# Patient Record
Sex: MALE | Race: BLACK OR AFRICAN AMERICAN | Employment: FULL TIME | URBAN - METROPOLITAN AREA
[De-identification: names, ages, dates, MRNs, and addresses within clinical notes are randomized per-mention and may not be internally consistent; named-entity substitution may affect disease eponyms.]

---

## 2017-05-09 ENCOUNTER — EMPLOYEE WELLNESS (OUTPATIENT)
Dept: OTHER | Age: 32
End: 2017-05-09

## 2017-05-09 LAB
CHOLESTEROL, TOTAL: 209 MG/DL (ref 0–199)
GLUCOSE BLD-MCNC: 69 MG/DL (ref 74–107)
HDLC SERPL-MCNC: 23 MG/DL
LDL CHOLESTEROL CALCULATED: ABNORMAL MG/DL (ref 0–99)
TRIGL SERPL-MCNC: 414 MG/DL (ref 0–149)

## 2017-05-10 LAB — LDL CHOLESTEROL DIRECT: 102 MG/DL (ref 0–129)

## 2018-03-20 VITALS — BODY MASS INDEX: 30.96 KG/M2 | WEIGHT: 222 LBS

## 2018-04-09 ENCOUNTER — EMPLOYEE WELLNESS (OUTPATIENT)
Dept: OTHER | Age: 33
End: 2018-04-09

## 2018-04-09 LAB
CHOLESTEROL, TOTAL: 252 MG/DL (ref 0–199)
GLUCOSE BLD-MCNC: 88 MG/DL (ref 74–107)
HDLC SERPL-MCNC: 16 MG/DL
LDL CHOLESTEROL CALCULATED: ABNORMAL MG/DL (ref 0–99)
TRIGL SERPL-MCNC: 1882 MG/DL (ref 0–149)

## 2018-04-16 VITALS — WEIGHT: 229 LBS | BODY MASS INDEX: 32.86 KG/M2

## 2018-06-20 ENCOUNTER — HOSPITAL ENCOUNTER (OUTPATIENT)
Dept: GENERAL RADIOLOGY | Age: 33
Discharge: HOME | End: 2018-06-22
Payer: COMMERCIAL

## 2018-06-20 ENCOUNTER — HOSPITAL ENCOUNTER (OUTPATIENT)
Age: 33
Discharge: HOME | End: 2018-06-22
Payer: COMMERCIAL

## 2018-06-20 DIAGNOSIS — M25.531 RIGHT WRIST PAIN: ICD-10-CM

## 2018-06-20 PROCEDURE — 73110 X-RAY EXAM OF WRIST: CPT

## 2018-07-24 ENCOUNTER — OFFICE VISIT (OUTPATIENT)
Dept: ORTHOPEDIC SURGERY | Age: 33
End: 2018-07-24
Payer: COMMERCIAL

## 2018-07-24 VITALS — HEIGHT: 71 IN | TEMPERATURE: 98 F | BODY MASS INDEX: 30.1 KG/M2 | WEIGHT: 215 LBS

## 2018-07-24 DIAGNOSIS — M77.8 WRIST TENDONITIS: Primary | ICD-10-CM

## 2018-07-24 DIAGNOSIS — M79.89 SOFT TISSUE MASS: ICD-10-CM

## 2018-07-24 PROCEDURE — 99204 OFFICE O/P NEW MOD 45 MIN: CPT | Performed by: ORTHOPAEDIC SURGERY

## 2018-07-24 RX ORDER — MELOXICAM 15 MG/1
15 TABLET ORAL DAILY
Qty: 30 TABLET | Refills: 3 | Status: SHIPPED
Start: 2018-07-24 | End: 2021-09-24 | Stop reason: ALTCHOICE

## 2018-07-24 RX ORDER — METHYLPREDNISOLONE 4 MG/1
4 TABLET ORAL SEE ADMIN INSTRUCTIONS
Qty: 1 KIT | Refills: 0 | Status: SHIPPED | OUTPATIENT
Start: 2018-07-24 | End: 2018-07-30

## 2018-07-24 NOTE — PROGRESS NOTES
legs/feet, (-) SOB, (-) cramping in legs/feet with walking. Respiratory: (-) SOB, (-) Coughing, (-) night sweats. GI: (-) nausea, (-) vomiting, (-) diarrhea, (-) blood in stool, (-) gastric ulcer. Psychiatric: (-) Depression, (-) Anxiety, (-) bipolar disease, (-) Alzheimer's Disease  Allergic/Immunologic: (-) allergies latex, (-) allergies metal, (-) skin sensitivity. Hematlogic: (-) anemia, (-) blood transfusion, (-) DVT/PE, (-) Clotting disorders      Subjective:    Constitution:  Temp 98 °F (36.7 °C)   Ht 5' 11\" (1.803 m)   Wt 215 lb (97.5 kg)   BMI 29.99 kg/m²     Psycihatric:  The patient is alert and oriented x 3, appears to be stated age and in no distress. Respiratory:  Respiratory effort is not labored. Patient is not gasping. Palpation of the chest reveals no tactile fremitus. Skin:  Upon inspection: the skin appears warm, dry and intact. There is not a previous scar over the affected area. There is not any cellulitis, lymphedema or cutaneous lesions noted in the lower extremities. Upon palpation there is no induration noted. Neurologic:  Motor exam of the upper extremities show: The reflexes in biceps/triceps/brachioradialis are equal and symmetric. Sensory exam C5-T1 are normal bilaterally. Cardiovascular: The vascular exam is normal and is well perfused to distal extremities. There are 2+ radial pulses bilaterally, and motor and sensation is intact to median, ulnar, and radial, musclocutaneus, and axillary nerve distribution and grossly symmetric bilaterally. There is cap refill noted less than two seconds in all digits. There is not edema of the bilateral upper extremities. There is not varicosities noted in the distal extremities. Lymph:  Upon palpation,  there is no lymphadenopathy noted in bilateral upper extremities. Musculoskeletal:  Gait: normal; examination of the nails and digits reveal no cyanosis or clubbing.     Cervical Exam:  On physical exam, Coy Penn is well-developed, well-nourished, oriented to person, place and time. his gait is normal.  On evaluation of his cervical spine, he has full range of motion of the cervical spine without pain. There is no cervical tenderness to palpation. Shoulder Exam:  On evaluation of his bilaterally upper extremities, his bilateral shoulder has no deformity. There is not evidence of scapular dyskinesis. There is not muscle atrophy in shoulder girdle. The range of motion for the Right Shoulder is 160/50/t10 and for the Left shoulder is 160/50/t10. Right shoulder Motor strength is 5/5 in the supraspinatus, 5/5 internal rotation and 5/5 in external rotation, and Left shoulder motor strength 5/5 in supraspinatus, 5/5 in internal rotation, 5/5 in external rotation. Elbow exam:  Evaluation of the elbow, reveals no signs of swelling or deformity. ROM is 0-150. There is not instability with varus/valgus stresses. Motor strength is 5/5 with flexion/extension. Wrist exam:  Inspection of the bilateral upper extremities, there is no evidence of deformity of the wrist.  ROM Wrist ROM R wrist DF 90, VF 90, L wrist DF 90, VF 90, R pronation 90/ supination 90, L pronation 90/supination 90. Motor strength is 5/5 with Dorsiflexion/Volarflexion/Supination/Pronation. Motor and sensation is intact and symmetric throughout the bilateral upper extremities in the median, ulnar and radial , musclcutaneous, and axillary nerve distributions. TTP over ulnar sided wrist    Hand exam:  The skin overlying the hand is  intact. There is not evidence of scar, lesion, laceration, or abrasion. The motion in the small joints of the hand are intact with no stiffness or deformity. The ROM in the MCP flexion 90/ extension 0 , PIP flexion 90/ extension 0, DIP flexion 70/ extension 0. There is not rotational deformity. There is no masses or adenopathy in bilateral upper extremities.   Radial pulses are 2+ and symmetric bilaterally. Capillary refill is intact and < 2 seconds. Motor strength is 5/5 with flexion and extension of the small finger joints. Right:  Phallens sign(-), Tinnells sign (-), Median nerve compression test (-),  Finklesteins (-), CMC Grind test (-), [employer](-). Left:    Phallens sign(-), Tinnells sign (-), Median nerve compression test (-),  Finklesteins (-), [employer] Grind test (-), [employer](-). Xrays: There is a smooth contour deformity involving the distal ulna along   the radial margin. There is suggestion of an adjacent ill-defined soft   tissue mass. No periosteal reaction or bone destruction noted. The   joint intercarpal spaces are symmetric. No erosive changes identified. No calcification present. No fracture or malalignment noted. Radiographic findings reviewed with patient    Impression:   Encounter Diagnoses   Name Primary?  Wrist tendonitis Yes    Soft tissue mass        Plan: Natural history and expected course discussed. Questions answered. Educational materials distributed. Rest, ice, compression, and elevation (RICE) therapy. Reduction in offending activity discussed.    HEP  MRI right wrist  FU after MRI for results

## 2018-07-24 NOTE — PATIENT INSTRUCTIONS
Patient Education        Wrist Tendinitis: Exercises  Your Care Instructions  Here are some examples of typical rehabilitation exercises for your condition. Start each exercise slowly. Ease off the exercise if you start to have pain. Your doctor or your physical or occupational therapist will tell you when you can start these exercises and which ones will work best for you. How to do the exercises  Wrist flexion and extension    1. Place your forearm on a table, with your hand and affected wrist extended beyond the table, palm down. 2. Bend your wrist to move your hand upward and allow your hand to close into a fist, then lower your hand and allow your fingers to relax. Hold each position for about 6 seconds. 3. Repeat 8 to 12 times. Hand flips    1. While seated, place your forearm and affected wrist on your thigh, palm down. 2. Flip your hand over so the back of your hand rests on your thigh and your palm is up. Alternate between palm up and palm down while keeping your forearm on your thigh. 3. Repeat 8 to 12 times. Wrist radial and ulnar deviation    1. Hold your affected hand out in front of you, palm down. 2. Slowly bend your wrist as far as you can from side to side. Hold each position for about 6 seconds. 3. Repeat 8 to 12 times. Wrist extensor stretch    1. Extend the arm with the affected wrist in front of you and point your fingers toward the floor. 2. With your other hand, gently bend your wrist farther until you feel a mild to moderate stretch in your forearm. 3. Hold the stretch for at least 15 to 30 seconds. 4. Repeat 2 to 4 times. 5. When you can do this stretch with ease and no pain, repeat steps 1 through 4. But this time extend your affected arm in front of you and make a fist with your palm facing down. Then bend your wrist, pointing your fist toward the floor. Wrist flexor stretch    1.  Extend the arm with the affected wrist in front of you with your palm facing away from your

## 2018-07-31 ENCOUNTER — HOSPITAL ENCOUNTER (OUTPATIENT)
Dept: MRI IMAGING | Age: 33
Discharge: HOME | End: 2018-08-02
Payer: COMMERCIAL

## 2018-07-31 DIAGNOSIS — M79.89 SOFT TISSUE MASS: ICD-10-CM

## 2018-07-31 DIAGNOSIS — M77.8 WRIST TENDONITIS: ICD-10-CM

## 2018-07-31 PROCEDURE — A9577 INJ MULTIHANCE: HCPCS | Performed by: RADIOLOGY

## 2018-07-31 PROCEDURE — 73223 MRI JOINT UPR EXTR W/O&W/DYE: CPT

## 2018-07-31 PROCEDURE — 6360000004 HC RX CONTRAST MEDICATION: Performed by: RADIOLOGY

## 2018-07-31 RX ADMIN — GADOBENATE DIMEGLUMINE 20 ML: 529 INJECTION, SOLUTION INTRAVENOUS at 07:40

## 2018-08-02 ENCOUNTER — OFFICE VISIT (OUTPATIENT)
Dept: ORTHOPEDIC SURGERY | Age: 33
End: 2018-08-02
Payer: COMMERCIAL

## 2018-08-02 DIAGNOSIS — S63.501A SPRAIN OF RIGHT WRIST, INITIAL ENCOUNTER: Primary | ICD-10-CM

## 2018-08-02 PROCEDURE — 99214 OFFICE O/P EST MOD 30 MIN: CPT | Performed by: ORTHOPAEDIC SURGERY

## 2018-08-02 NOTE — PROGRESS NOTES
Chief Complaint   Patient presents with    Wrist Pain     follow up right wrist MRI       Stephani Centeno is a 34y.o. year old male that follows up today for his right wrist pain. He recently underwent an MRI and is here to discuss the results. History reviewed. No pertinent past medical history. Past Surgical History:   Procedure Laterality Date    NECK SURGERY         Current Outpatient Prescriptions:     meloxicam (MOBIC) 15 MG tablet, Take 1 tablet by mouth daily, Disp: 30 tablet, Rfl: 3  No Known Allergies  Social History     Social History    Marital status: Single     Spouse name: N/A    Number of children: N/A    Years of education: N/A     Occupational History    Not on file. Social History Main Topics    Smoking status: Never Smoker    Smokeless tobacco: Never Used    Alcohol use Yes      Comment: occ    Drug use: No    Sexual activity: Not on file     Other Topics Concern    Not on file     Social History Narrative    No narrative on file     Family History   Problem Relation Age of Onset    Other Father         kidney issues       REVIEW OF SYSTEMS:     General/Constitution:  (-)weight loss, (-)fever, (-)chills, (-)weakness. Skin: (-) rash,(-) psoriasis,(-) eczema, (-)skin cancer. Musculoskeletal: (-) fractures,  (-) dislocations,(-) collagen vascular disease, (-) fibromyalgia, (-) multiple sclerosis, (-) muscular dystrophy, (-) RSD,(-) joint pain (-)swelling, (-) joint pain,swelling. Neurologic: (-) epilepsy, (-)seizures,(-) brain tumor,(-) TIA, (-)stroke, (-)headaches, (-)Parkinson disease,(-) memory loss, (-) LOC. Cardiovascular: (-) Chest pain, (-) swelling in legs/feet, (-) SOB, (-) cramping in legs/feet with walking. Respiratory: (-) SOB, (-) Coughing, (-) night sweats. GI: (-) nausea, (-) vomiting, (-) diarrhea, (-) blood in stool, (-) gastric ulcer.   Psychiatric: (-) Depression, (-) Anxiety, (-) bipolar disease, (-) Alzheimer's Disease  Allergic/Immunologic: (-) [place] (-), [employer] Grind test (-), [employer](-). Xrays: There is a smooth contour deformity involving the distal ulna along   the radial margin. There is suggestion of an adjacent ill-defined soft   tissue mass. No periosteal reaction or bone destruction noted. The   joint intercarpal spaces are symmetric. No erosive changes identified. No calcification present. No fracture or malalignment noted. MRI:  1. No soft tissue mass or abnormal collection is identified. 2. Tendons are intact without evidence of tenosynovitis. Radiographic findings reviewed with patient    Impression:   Encounter Diagnosis   Name Primary?  Sprain of right wrist, initial encounter Yes       Plan: Natural history and expected course discussed. Questions answered. Educational materials distributed. Rest, ice, compression, and elevation (RICE) therapy. Reduction in offending activity discussed. His MRI is normal.  He will continue with activities as tolerated and he will stop all meds. If his pain worsens, he will give us a call and I can perform an injection. I will see him back PRN.

## 2019-03-23 ENCOUNTER — HOSPITAL ENCOUNTER (EMERGENCY)
Age: 34
Discharge: HOME | End: 2019-03-23
Attending: EMERGENCY MEDICINE
Payer: COMMERCIAL

## 2019-03-23 VITALS
BODY MASS INDEX: 32.92 KG/M2 | RESPIRATION RATE: 20 BRPM | WEIGHT: 236 LBS | OXYGEN SATURATION: 96 % | HEART RATE: 96 BPM | TEMPERATURE: 98 F | DIASTOLIC BLOOD PRESSURE: 90 MMHG | SYSTOLIC BLOOD PRESSURE: 142 MMHG

## 2019-03-23 DIAGNOSIS — J00 ACUTE NASOPHARYNGITIS: Primary | ICD-10-CM

## 2019-03-23 DIAGNOSIS — A64 SEXUALLY TRANSMITTED INFECTION: ICD-10-CM

## 2019-03-23 DIAGNOSIS — H65.192 OTHER ACUTE NONSUPPURATIVE OTITIS MEDIA OF LEFT EAR, RECURRENCE NOT SPECIFIED: ICD-10-CM

## 2019-03-23 PROCEDURE — 6370000000 HC RX 637 (ALT 250 FOR IP): Performed by: EMERGENCY MEDICINE

## 2019-03-23 PROCEDURE — 6360000002 HC RX W HCPCS: Performed by: EMERGENCY MEDICINE

## 2019-03-23 PROCEDURE — 99282 EMERGENCY DEPT VISIT SF MDM: CPT

## 2019-03-23 PROCEDURE — 96372 THER/PROPH/DIAG INJ SC/IM: CPT

## 2019-03-23 RX ORDER — AZITHROMYCIN 250 MG/1
1000 TABLET, FILM COATED ORAL ONCE
Status: COMPLETED | OUTPATIENT
Start: 2019-03-23 | End: 2019-03-23

## 2019-03-23 RX ORDER — AZITHROMYCIN 250 MG/1
TABLET, FILM COATED ORAL
Qty: 1 PACKET | Refills: 0 | Status: SHIPPED | OUTPATIENT
Start: 2019-03-23 | End: 2019-03-27

## 2019-03-23 RX ORDER — METRONIDAZOLE 250 MG/1
2000 TABLET ORAL ONCE
Status: COMPLETED | OUTPATIENT
Start: 2019-03-23 | End: 2019-03-23

## 2019-03-23 RX ORDER — CEFTRIAXONE SODIUM 250 MG/1
250 INJECTION, POWDER, FOR SOLUTION INTRAMUSCULAR; INTRAVENOUS ONCE
Status: COMPLETED | OUTPATIENT
Start: 2019-03-23 | End: 2019-03-23

## 2019-03-23 RX ORDER — BENZONATATE 100 MG/1
100-200 CAPSULE ORAL 3 TIMES DAILY PRN
Qty: 60 CAPSULE | Refills: 0 | Status: SHIPPED | OUTPATIENT
Start: 2019-03-23 | End: 2019-03-30

## 2019-03-23 RX ADMIN — CEFTRIAXONE SODIUM 250 MG: 250 INJECTION, POWDER, FOR SOLUTION INTRAMUSCULAR; INTRAVENOUS at 12:20

## 2019-03-23 RX ADMIN — METRONIDAZOLE 2000 MG: 250 TABLET ORAL at 12:22

## 2019-03-23 RX ADMIN — AZITHROMYCIN 1000 MG: 250 TABLET, FILM COATED ORAL at 12:20

## 2019-03-23 ASSESSMENT — PAIN DESCRIPTION - LOCATION: LOCATION: HEAD

## 2019-03-23 ASSESSMENT — PAIN SCALES - GENERAL
PAINLEVEL_OUTOF10: 1
PAINLEVEL_OUTOF10: 1

## 2019-03-23 ASSESSMENT — PAIN DESCRIPTION - ONSET: ONSET: GRADUAL

## 2019-03-23 ASSESSMENT — PAIN - FUNCTIONAL ASSESSMENT: PAIN_FUNCTIONAL_ASSESSMENT: 0-10

## 2019-03-23 ASSESSMENT — PAIN DESCRIPTION - FREQUENCY: FREQUENCY: CONTINUOUS

## 2019-03-23 ASSESSMENT — PAIN DESCRIPTION - PROGRESSION: CLINICAL_PROGRESSION: NOT CHANGED

## 2019-03-23 ASSESSMENT — PAIN DESCRIPTION - DESCRIPTORS: DESCRIPTORS: ACHING

## 2019-07-15 ENCOUNTER — HOSPITAL ENCOUNTER (EMERGENCY)
Age: 34
Discharge: HOME | End: 2019-07-15
Payer: COMMERCIAL

## 2019-07-15 VITALS
OXYGEN SATURATION: 98 % | BODY MASS INDEX: 30.68 KG/M2 | TEMPERATURE: 98 F | DIASTOLIC BLOOD PRESSURE: 85 MMHG | HEART RATE: 104 BPM | WEIGHT: 220 LBS | RESPIRATION RATE: 16 BRPM | SYSTOLIC BLOOD PRESSURE: 132 MMHG

## 2019-07-15 DIAGNOSIS — Z20.2 EXPOSURE TO STD: Primary | ICD-10-CM

## 2019-07-15 PROCEDURE — 6370000000 HC RX 637 (ALT 250 FOR IP): Performed by: NURSE PRACTITIONER

## 2019-07-15 PROCEDURE — 6360000002 HC RX W HCPCS: Performed by: NURSE PRACTITIONER

## 2019-07-15 PROCEDURE — 2500000003 HC RX 250 WO HCPCS: Performed by: NURSE PRACTITIONER

## 2019-07-15 PROCEDURE — 99212 OFFICE O/P EST SF 10 MIN: CPT

## 2019-07-15 PROCEDURE — 96372 THER/PROPH/DIAG INJ SC/IM: CPT

## 2019-07-15 RX ORDER — AZITHROMYCIN 250 MG/1
1000 TABLET, FILM COATED ORAL ONCE
Status: COMPLETED | OUTPATIENT
Start: 2019-07-15 | End: 2019-07-15

## 2019-07-15 RX ADMIN — LIDOCAINE HYDROCHLORIDE 250 MG: 10 INJECTION, SOLUTION INFILTRATION; PERINEURAL at 18:27

## 2019-07-15 RX ADMIN — AZITHROMYCIN 1000 MG: 250 TABLET, FILM COATED ORAL at 18:27

## 2019-07-15 NOTE — ED PROVIDER NOTES
specific details for the plan of care and counseling regarding the diagnosis and prognosis. Questions are answered at this time and they are agreeable with the plan.  ------------------------------------Impression & Disposition Section------------------------------------  Impression(s):  1. Exposure to STD      Disposition:  Disposition: Discharge to home  Patient condition is good    New Prescriptions    No medications on file     * NOTE: This report was transcribed using voice recognition software. Every effort was made to ensure accuracy; however, inadvertent computerized transcription errors may be present.      Collette Lights, APRN - LIAN  07/15/19 1115

## 2020-01-26 ENCOUNTER — APPOINTMENT (OUTPATIENT)
Dept: GENERAL RADIOLOGY | Age: 35
End: 2020-01-26
Payer: COMMERCIAL

## 2020-01-26 ENCOUNTER — HOSPITAL ENCOUNTER (EMERGENCY)
Age: 35
Discharge: HOME | End: 2020-01-26
Payer: COMMERCIAL

## 2020-01-26 VITALS
RESPIRATION RATE: 16 BRPM | OXYGEN SATURATION: 97 % | DIASTOLIC BLOOD PRESSURE: 90 MMHG | TEMPERATURE: 98.1 F | SYSTOLIC BLOOD PRESSURE: 145 MMHG | WEIGHT: 220 LBS | HEART RATE: 85 BPM | BODY MASS INDEX: 30.68 KG/M2

## 2020-01-26 PROCEDURE — 96372 THER/PROPH/DIAG INJ SC/IM: CPT

## 2020-01-26 PROCEDURE — 6360000002 HC RX W HCPCS: Performed by: PHYSICIAN ASSISTANT

## 2020-01-26 PROCEDURE — 2500000003 HC RX 250 WO HCPCS: Performed by: PHYSICIAN ASSISTANT

## 2020-01-26 PROCEDURE — 73110 X-RAY EXAM OF WRIST: CPT

## 2020-01-26 PROCEDURE — 99212 OFFICE O/P EST SF 10 MIN: CPT

## 2020-01-26 PROCEDURE — 6370000000 HC RX 637 (ALT 250 FOR IP): Performed by: PHYSICIAN ASSISTANT

## 2020-01-26 RX ORDER — AZITHROMYCIN 250 MG/1
1000 TABLET, FILM COATED ORAL ONCE
Status: COMPLETED | OUTPATIENT
Start: 2020-01-26 | End: 2020-01-26

## 2020-01-26 RX ADMIN — AZITHROMYCIN MONOHYDRATE 1000 MG: 250 TABLET ORAL at 19:18

## 2020-01-26 RX ADMIN — LIDOCAINE HYDROCHLORIDE 250 MG: 10 INJECTION, SOLUTION INFILTRATION; PERINEURAL at 19:18

## 2020-01-26 ASSESSMENT — PAIN DESCRIPTION - PAIN TYPE: TYPE: ACUTE PAIN

## 2020-01-26 ASSESSMENT — PAIN SCALES - GENERAL: PAINLEVEL_OUTOF10: 6

## 2020-01-26 ASSESSMENT — PAIN DESCRIPTION - ORIENTATION: ORIENTATION: RIGHT

## 2020-01-26 ASSESSMENT — PAIN DESCRIPTION - LOCATION: LOCATION: WRIST

## 2020-01-26 ASSESSMENT — PAIN DESCRIPTION - DESCRIPTORS: DESCRIPTORS: ACHING

## 2020-05-27 ENCOUNTER — HOSPITAL ENCOUNTER (EMERGENCY)
Age: 35
Discharge: HOME | End: 2020-05-27
Attending: EMERGENCY MEDICINE
Payer: COMMERCIAL

## 2020-05-27 VITALS
SYSTOLIC BLOOD PRESSURE: 139 MMHG | RESPIRATION RATE: 16 BRPM | TEMPERATURE: 97.1 F | DIASTOLIC BLOOD PRESSURE: 92 MMHG | HEIGHT: 72 IN | WEIGHT: 200 LBS | HEART RATE: 86 BPM | OXYGEN SATURATION: 98 % | BODY MASS INDEX: 27.09 KG/M2

## 2020-05-27 PROCEDURE — 6360000002 HC RX W HCPCS: Performed by: EMERGENCY MEDICINE

## 2020-05-27 PROCEDURE — G0381 LEV 2 HOSP TYPE B ED VISIT: HCPCS

## 2020-05-27 PROCEDURE — 96372 THER/PROPH/DIAG INJ SC/IM: CPT

## 2020-05-27 RX ORDER — METRONIDAZOLE 500 MG/1
2000 TABLET ORAL ONCE
Qty: 4 TABLET | Refills: 0 | Status: SHIPPED | OUTPATIENT
Start: 2020-05-27 | End: 2020-05-27

## 2020-05-27 RX ORDER — CEFTRIAXONE SODIUM 250 MG/1
250 INJECTION, POWDER, FOR SOLUTION INTRAMUSCULAR; INTRAVENOUS ONCE
Status: COMPLETED | OUTPATIENT
Start: 2020-05-27 | End: 2020-05-27

## 2020-05-27 RX ORDER — AZITHROMYCIN 250 MG/1
TABLET, FILM COATED ORAL
Qty: 4 TABLET | Refills: 0 | Status: SHIPPED | OUTPATIENT
Start: 2020-05-27 | End: 2020-06-06

## 2020-05-27 RX ORDER — DIAPER,BRIEF,INFANT-TODD,DISP
EACH MISCELLANEOUS
Qty: 1 TUBE | Refills: 1 | Status: SHIPPED | OUTPATIENT
Start: 2020-05-27 | End: 2020-06-03

## 2020-05-27 RX ADMIN — CEFTRIAXONE SODIUM 250 MG: 250 INJECTION, POWDER, FOR SOLUTION INTRAMUSCULAR; INTRAVENOUS at 18:39

## 2020-08-17 ENCOUNTER — HOSPITAL ENCOUNTER (EMERGENCY)
Age: 35
Discharge: HOME | End: 2020-08-17
Attending: EMERGENCY MEDICINE
Payer: COMMERCIAL

## 2020-08-17 VITALS
HEIGHT: 72 IN | WEIGHT: 208 LBS | OXYGEN SATURATION: 98 % | SYSTOLIC BLOOD PRESSURE: 140 MMHG | DIASTOLIC BLOOD PRESSURE: 98 MMHG | HEART RATE: 81 BPM | BODY MASS INDEX: 28.17 KG/M2 | TEMPERATURE: 96.1 F | RESPIRATION RATE: 16 BRPM

## 2020-08-17 PROCEDURE — G0381 LEV 2 HOSP TYPE B ED VISIT: HCPCS

## 2020-08-17 PROCEDURE — 6360000002 HC RX W HCPCS: Performed by: EMERGENCY MEDICINE

## 2020-08-17 PROCEDURE — 96372 THER/PROPH/DIAG INJ SC/IM: CPT

## 2020-08-17 RX ORDER — CEFTRIAXONE SODIUM 250 MG/1
250 INJECTION, POWDER, FOR SOLUTION INTRAMUSCULAR; INTRAVENOUS ONCE
Status: COMPLETED | OUTPATIENT
Start: 2020-08-17 | End: 2020-08-17

## 2020-08-17 RX ORDER — CEFTRIAXONE SODIUM 250 MG/1
INJECTION, POWDER, FOR SOLUTION INTRAMUSCULAR; INTRAVENOUS
Status: DISPENSED
Start: 2020-08-17 | End: 2020-08-18

## 2020-08-17 RX ORDER — METRONIDAZOLE 500 MG/1
2000 TABLET ORAL ONCE
Qty: 4 TABLET | Refills: 0 | Status: SHIPPED | OUTPATIENT
Start: 2020-08-17 | End: 2020-08-17

## 2020-08-17 RX ORDER — AZITHROMYCIN 250 MG/1
TABLET, FILM COATED ORAL
Qty: 4 TABLET | Refills: 0 | Status: SHIPPED | OUTPATIENT
Start: 2020-08-17 | End: 2020-08-27

## 2020-08-17 RX ORDER — DIAPER,BRIEF,INFANT-TODD,DISP
EACH MISCELLANEOUS
Qty: 1 TUBE | Refills: 1 | Status: SHIPPED | OUTPATIENT
Start: 2020-08-17 | End: 2020-08-24

## 2020-08-17 RX ADMIN — CEFTRIAXONE SODIUM 250 MG: 250 INJECTION, POWDER, FOR SOLUTION INTRAMUSCULAR; INTRAVENOUS at 19:35

## 2020-08-17 NOTE — ED PROVIDER NOTES
EOMI  Mouth: Oropharynx clear, handling secretions, no trismus  Neck: Supple, full ROM, no meningeal signs  Pulmonary: Lungs clear to auscultation bilaterally, no wheezes, rales, or rhonchi. Not in respiratory distress  Cardiovascular:  Regular rate and rhythm, no murmurs, gallops, or rubs. 2+ distal pulses  Abdomen: Soft, non tender, non distended,   Extremities: Moves all extremities x 4. Warm and well perfused  Skin: scattered erythematous papulovesicular lesions on face  Neurologic: GCS 15,  Psych: Normal Affect      ------------------------------ ED COURSE/MEDICAL DECISION MAKING----------------------  Medications   cefTRIAXone (ROCEPHIN) injection 250 mg (250 mg Intramuscular Given 8/17/20 1935)         Medical Decision Making:      Patient's Medications   New Prescriptions    AZITHROMYCIN (ZITHROMAX) 250 MG TABLET    Take all 4 tablets orally at one time    HYDROCORTISONE 1 % CREAM    APPLY TO AFFECTED AREA BID FOR 10 DAYS    METRONIDAZOLE (FLAGYL) 500 MG TABLET    Take 4 tablets by mouth once for 1 dose    MUPIROCIN (BACTROBAN) 2 % OINTMENT    Apply topically 3 times daily. Previous Medications    MELOXICAM (MOBIC) 15 MG TABLET    Take 1 tablet by mouth daily   Modified Medications    No medications on file   Discontinued Medications    No medications on file         Counseling: The emergency provider has spoken with the patient and discussed todays results, in addition to providing specific details for the plan of care and counseling regarding the diagnosis and prognosis. Questions are answered at this time and they are agreeable with the plan.      --------------------------------- IMPRESSION AND DISPOSITION ---------------------------------    IMPRESSION  1. Irritant dermatitis    2.  Concern about STD in male without diagnosis        DISPOSITION  Disposition: Discharge to home  Patient condition is good                 César Yan MD  08/17/20 1933       Brad Ortiz MD  08/17/20 [id number]

## 2020-12-04 ENCOUNTER — HOSPITAL ENCOUNTER (EMERGENCY)
Age: 35
Discharge: HOME | End: 2020-12-04
Payer: COMMERCIAL

## 2020-12-04 VITALS
RESPIRATION RATE: 14 BRPM | SYSTOLIC BLOOD PRESSURE: 114 MMHG | HEART RATE: 90 BPM | TEMPERATURE: 97.4 F | OXYGEN SATURATION: 95 % | WEIGHT: 220 LBS | HEIGHT: 71 IN | DIASTOLIC BLOOD PRESSURE: 76 MMHG | BODY MASS INDEX: 30.8 KG/M2

## 2020-12-04 PROCEDURE — 99212 OFFICE O/P EST SF 10 MIN: CPT

## 2020-12-04 PROCEDURE — 6370000000 HC RX 637 (ALT 250 FOR IP): Performed by: NURSE PRACTITIONER

## 2020-12-04 RX ORDER — TETRACAINE HYDROCHLORIDE 5 MG/ML
2 SOLUTION OPHTHALMIC ONCE
Status: COMPLETED | OUTPATIENT
Start: 2020-12-04 | End: 2020-12-04

## 2020-12-04 RX ORDER — POLYMYXIN B SULFATE AND TRIMETHOPRIM 1; 10000 MG/ML; [USP'U]/ML
1 SOLUTION OPHTHALMIC EVERY 4 HOURS
Qty: 1 BOTTLE | Refills: 0 | Status: SHIPPED | OUTPATIENT
Start: 2020-12-04 | End: 2020-12-11

## 2020-12-04 RX ORDER — PURIFIED WATER 986 MG/ML
1 SOLUTION OPHTHALMIC ONCE
Status: COMPLETED | OUTPATIENT
Start: 2020-12-04 | End: 2020-12-04

## 2020-12-04 RX ADMIN — PURIFIED WATER 1 DROP: 986 SOLUTION OPHTHALMIC at 09:03

## 2020-12-04 RX ADMIN — TETRACAINE HYDROCHLORIDE 2 DROP: 5 SOLUTION OPHTHALMIC at 09:03

## 2020-12-04 RX ADMIN — FLUORESCEIN SODIUM 1 EACH: 0.6 STRIP OPHTHALMIC at 09:03

## 2020-12-04 NOTE — ED PROVIDER NOTES
Department of Emergency Medicine  ED Provider Note  Admit Date: 12/4/2020  Room: 02/02            HPI:  12/4/20, Time: 8:59 AM SB Sanchez is a 27 y.o. old male presenting to the emergency department with complaints of right eye irritation. He said this started yesterday. He said he does not wear contacts or glasses. He states it feels like there is something in it. He said it is watering a lot and is red. MecReview of Systems:   Pertinent positives and negatives are stated within HPI, all other systems reviewed and are negative.          --------------------------------------------- PAST HISTORY ---------------------------------------------  Past Medical History:  has no past medical history on file. Past Surgical History:  has a past surgical history that includes Neck surgery. Social History:  reports that he has never smoked. He has never used smokeless tobacco. He reports current alcohol use. He reports that he does not use drugs. Family History: family history includes Other in his father. The patients home medications have been reviewed. Allergies: Patient has no known allergies. ---------------------------------------------------PHYSICAL EXAM--------------------------------------    Constitutional/General: Alert and oriented x3, well appearing, non toxic in NAD  Head: Normocephalic and atraumatic. No periorbital erythema or warmth or swelling  Eyes: PERRL, 3 mm, conjunctiva right is redddened,  no evidence of hyphema, no evidence of entrapment. I No pain with EOM, and EOMI. Direct and consensual light reflex normal.    Mouth: Oropharynx clear, handling secretions, no trismus  Neck: Supple, full ROM, non tender to palpation in the midline,   Pulmonary: Lungs clear to auscultation bilaterally. Not in respiratory distress  Cardiovascular:  Regular rate. Regular rhythm  Abdomen: Soft. Non tender. Non distended. Musculoskeletal: Moves all extremities x 4. Warm and well perfused  Skin: warm and dry. No rashes. Neurologic: GCS 15  Psych: Normal Affect    -------------------------------------------------- RESULTS -------------------------------------------------  I have personally reviewed all laboratory and imaging results for this patient. Results are listed below. LABS:  No results found for this visit on 12/04/20. RADIOLOGY:  Interpreted by Radiologist.  No orders to display           ------------------------- NURSING NOTES AND VITALS REVIEWED ---------------------------   The nursing notes within the ED encounter and vital signs as below have been reviewed by myself. /76   Pulse 90   Temp 97.4 °F (36.3 °C) (Infrared)   Resp 14   Ht 5' 11\" (1.803 m)   Wt 220 lb (99.8 kg)   SpO2 95%   BMI 30.68 kg/m²   Oxygen Saturation Interpretation: Normal    The patients available past medical records and past encounters were reviewed. ------------------------------ ED COURSE/MEDICAL DECISION MAKING----------------------  Medications   fluorescein ophthalmic strip 1 each (1 each Left Eye Given 12/4/20 0903)   tetracaine (TETRAVISC) 0.5 % ophthalmic solution 2 drop (2 drops Ophthalmic Given 12/4/20 0903)   eye stream ophthalmic solution 1 drop (1 drop Right Eye Given 12/4/20 0903)                 Medical Decision Making: I anesthetized his eye with tetracaine, I stained his eye with fluorescein I do not see any corneal abrasions is a small amount of whitish drainage at the inner canthus area of the eye I did put him on Polytrim eyedrops and I referred him to ophthalmology for follow-up. I did search for foreign bodies and look for foreign bodies I did caprice his eyelids no foreign bodies are seen. Counseling: The emergency provider has spoken with the patient and discussed todays results, in addition to providing specific details for the plan of care and counseling regarding the diagnosis and prognosis.   Questions are answered at this time and they are agreeable with the plan.       --------------------------------- IMPRESSION AND DISPOSITION ---------------------------------    IMPRESSION  1.  Irritation of eye        DISPOSITION  Disposition: Discharge to home  Patient condition is good           TRA Kan CNP  12/04/20 0915       TRA Kan CNP  12/04/20 [id number]

## 2021-09-24 ENCOUNTER — APPOINTMENT (OUTPATIENT)
Dept: GENERAL RADIOLOGY | Age: 36
DRG: 871 | End: 2021-09-24
Payer: COMMERCIAL

## 2021-09-24 ENCOUNTER — HOSPITAL ENCOUNTER (INPATIENT)
Age: 36
LOS: 2 days | Discharge: HOME | DRG: 871 | End: 2021-09-26
Attending: STUDENT IN AN ORGANIZED HEALTH CARE EDUCATION/TRAINING PROGRAM | Admitting: INTERNAL MEDICINE
Payer: COMMERCIAL

## 2021-09-24 ENCOUNTER — APPOINTMENT (OUTPATIENT)
Dept: CT IMAGING | Age: 36
DRG: 871 | End: 2021-09-24
Payer: COMMERCIAL

## 2021-09-24 DIAGNOSIS — U07.1 PNEUMONIA DUE TO 2019-NCOV: ICD-10-CM

## 2021-09-24 DIAGNOSIS — U07.1 ACUTE HYPOXEMIC RESPIRATORY FAILURE DUE TO COVID-19 (HCC): Primary | ICD-10-CM

## 2021-09-24 DIAGNOSIS — J96.01 ACUTE HYPOXEMIC RESPIRATORY FAILURE DUE TO COVID-19 (HCC): Primary | ICD-10-CM

## 2021-09-24 DIAGNOSIS — J12.82 PNEUMONIA DUE TO 2019-NCOV: ICD-10-CM

## 2021-09-24 DIAGNOSIS — U07.1 COVID-19: ICD-10-CM

## 2021-09-24 DIAGNOSIS — R74.01 TRANSAMINITIS: ICD-10-CM

## 2021-09-24 LAB
ALBUMIN SERPL-MCNC: 4.2 G/DL (ref 3.5–5.2)
ALP BLD-CCNC: 89 U/L (ref 40–129)
ALT SERPL-CCNC: 133 U/L (ref 0–40)
ANION GAP SERPL CALCULATED.3IONS-SCNC: 13 MMOL/L (ref 7–16)
AST SERPL-CCNC: 226 U/L (ref 0–39)
ATYPICAL LYMPHOCYTE RELATIVE PERCENT: 0.9 % (ref 0–4)
BASOPHILS ABSOLUTE: 0 E9/L (ref 0–0.2)
BASOPHILS RELATIVE PERCENT: 0.2 % (ref 0–2)
BILIRUB SERPL-MCNC: 1 MG/DL (ref 0–1.2)
BUN BLDV-MCNC: 16 MG/DL (ref 6–20)
C-REACTIVE PROTEIN: 4.7 MG/DL (ref 0–0.4)
CALCIUM SERPL-MCNC: 9.6 MG/DL (ref 8.6–10.2)
CHLORIDE BLD-SCNC: 92 MMOL/L (ref 98–107)
CO2: 26 MMOL/L (ref 22–29)
CREAT SERPL-MCNC: 1 MG/DL (ref 0.7–1.2)
D DIMER: 395 NG/ML DDU
EOSINOPHILS ABSOLUTE: 0 E9/L (ref 0.05–0.5)
EOSINOPHILS RELATIVE PERCENT: 0 % (ref 0–6)
FERRITIN: 4850 NG/ML
FIBRINOGEN: >700 MG/DL (ref 225–540)
GFR AFRICAN AMERICAN: >60
GFR NON-AFRICAN AMERICAN: >60 ML/MIN/1.73
GLUCOSE BLD-MCNC: 124 MG/DL (ref 74–99)
HCT VFR BLD CALC: 43 % (ref 37–54)
HEMOGLOBIN: 14.8 G/DL (ref 12.5–16.5)
LACTATE DEHYDROGENASE: 1450 U/L (ref 135–225)
LYMPHOCYTES ABSOLUTE: 0.74 E9/L (ref 1.5–4)
LYMPHOCYTES RELATIVE PERCENT: 11.3 % (ref 20–42)
MCH RBC QN AUTO: 31.7 PG (ref 26–35)
MCHC RBC AUTO-ENTMCNC: 34.4 % (ref 32–34.5)
MCV RBC AUTO: 92.1 FL (ref 80–99.9)
MONOCYTES ABSOLUTE: 0.81 E9/L (ref 0.1–0.95)
MONOCYTES RELATIVE PERCENT: 13 % (ref 2–12)
NEUTROPHILS ABSOLUTE: 4.65 E9/L (ref 1.8–7.3)
NEUTROPHILS RELATIVE PERCENT: 74.8 % (ref 43–80)
PDW BLD-RTO: 11.9 FL (ref 11.5–15)
PLATELET # BLD: 231 E9/L (ref 130–450)
PMV BLD AUTO: 11.3 FL (ref 7–12)
POTASSIUM REFLEX MAGNESIUM: 3.7 MMOL/L (ref 3.5–5)
PROCALCITONIN: 0.25 NG/ML (ref 0–0.08)
RBC # BLD: 4.67 E12/L (ref 3.8–5.8)
RBC # BLD: NORMAL 10*6/UL
SODIUM BLD-SCNC: 131 MMOL/L (ref 132–146)
TOTAL PROTEIN: 8.4 G/DL (ref 6.4–8.3)
WBC # BLD: 6.2 E9/L (ref 4.5–11.5)

## 2021-09-24 PROCEDURE — 84145 PROCALCITONIN (PCT): CPT

## 2021-09-24 PROCEDURE — 6370000000 HC RX 637 (ALT 250 FOR IP): Performed by: STUDENT IN AN ORGANIZED HEALTH CARE EDUCATION/TRAINING PROGRAM

## 2021-09-24 PROCEDURE — 71045 X-RAY EXAM CHEST 1 VIEW: CPT

## 2021-09-24 PROCEDURE — 93005 ELECTROCARDIOGRAM TRACING: CPT | Performed by: STUDENT IN AN ORGANIZED HEALTH CARE EDUCATION/TRAINING PROGRAM

## 2021-09-24 PROCEDURE — 2700000000 HC OXYGEN THERAPY PER DAY

## 2021-09-24 PROCEDURE — 85384 FIBRINOGEN ACTIVITY: CPT

## 2021-09-24 PROCEDURE — 6360000002 HC RX W HCPCS: Performed by: INTERNAL MEDICINE

## 2021-09-24 PROCEDURE — 86140 C-REACTIVE PROTEIN: CPT

## 2021-09-24 PROCEDURE — 87449 NOS EACH ORGANISM AG IA: CPT

## 2021-09-24 PROCEDURE — 96375 TX/PRO/DX INJ NEW DRUG ADDON: CPT

## 2021-09-24 PROCEDURE — 6370000000 HC RX 637 (ALT 250 FOR IP): Performed by: INTERNAL MEDICINE

## 2021-09-24 PROCEDURE — 99285 EMERGENCY DEPT VISIT HI MDM: CPT

## 2021-09-24 PROCEDURE — 71275 CT ANGIOGRAPHY CHEST: CPT

## 2021-09-24 PROCEDURE — 96361 HYDRATE IV INFUSION ADD-ON: CPT

## 2021-09-24 PROCEDURE — 6360000002 HC RX W HCPCS: Performed by: STUDENT IN AN ORGANIZED HEALTH CARE EDUCATION/TRAINING PROGRAM

## 2021-09-24 PROCEDURE — 83615 LACTATE (LD) (LDH) ENZYME: CPT

## 2021-09-24 PROCEDURE — 82728 ASSAY OF FERRITIN: CPT

## 2021-09-24 PROCEDURE — 80053 COMPREHEN METABOLIC PANEL: CPT

## 2021-09-24 PROCEDURE — 2580000003 HC RX 258: Performed by: STUDENT IN AN ORGANIZED HEALTH CARE EDUCATION/TRAINING PROGRAM

## 2021-09-24 PROCEDURE — 85025 COMPLETE CBC W/AUTO DIFF WBC: CPT

## 2021-09-24 PROCEDURE — 85378 FIBRIN DEGRADE SEMIQUANT: CPT

## 2021-09-24 PROCEDURE — 96374 THER/PROPH/DIAG INJ IV PUSH: CPT

## 2021-09-24 PROCEDURE — 2060000000 HC ICU INTERMEDIATE R&B

## 2021-09-24 PROCEDURE — 6360000004 HC RX CONTRAST MEDICATION: Performed by: RADIOLOGY

## 2021-09-24 PROCEDURE — 2580000003 HC RX 258: Performed by: INTERNAL MEDICINE

## 2021-09-24 RX ORDER — POLYETHYLENE GLYCOL 3350 17 G/17G
17 POWDER, FOR SOLUTION ORAL DAILY PRN
Status: DISCONTINUED | OUTPATIENT
Start: 2021-09-24 | End: 2021-09-26 | Stop reason: HOSPADM

## 2021-09-24 RX ORDER — LANOLIN ALCOHOL/MO/W.PET/CERES
100 CREAM (GRAM) TOPICAL DAILY
Status: DISCONTINUED | OUTPATIENT
Start: 2021-09-24 | End: 2021-09-24

## 2021-09-24 RX ORDER — ZINC SULFATE 50(220)MG
50 CAPSULE ORAL DAILY
Status: DISCONTINUED | OUTPATIENT
Start: 2021-09-24 | End: 2021-09-26 | Stop reason: HOSPADM

## 2021-09-24 RX ORDER — SODIUM CHLORIDE 0.9 % (FLUSH) 0.9 %
5-40 SYRINGE (ML) INJECTION PRN
Status: DISCONTINUED | OUTPATIENT
Start: 2021-09-24 | End: 2021-09-26 | Stop reason: HOSPADM

## 2021-09-24 RX ORDER — VITAMIN B COMPLEX
6000 TABLET ORAL DAILY
Status: DISCONTINUED | OUTPATIENT
Start: 2021-09-24 | End: 2021-09-26 | Stop reason: HOSPADM

## 2021-09-24 RX ORDER — LANOLIN ALCOHOL/MO/W.PET/CERES
50 CREAM (GRAM) TOPICAL DAILY
Status: DISCONTINUED | OUTPATIENT
Start: 2021-09-24 | End: 2021-09-26 | Stop reason: HOSPADM

## 2021-09-24 RX ORDER — ONDANSETRON 2 MG/ML
8 INJECTION INTRAMUSCULAR; INTRAVENOUS ONCE
Status: COMPLETED | OUTPATIENT
Start: 2021-09-24 | End: 2021-09-24

## 2021-09-24 RX ORDER — ASCORBIC ACID 500 MG
1000 TABLET ORAL DAILY
Status: DISCONTINUED | OUTPATIENT
Start: 2021-09-24 | End: 2021-09-26 | Stop reason: HOSPADM

## 2021-09-24 RX ORDER — DEXAMETHASONE SODIUM PHOSPHATE 10 MG/ML
10 INJECTION INTRAMUSCULAR; INTRAVENOUS EVERY 24 HOURS
Status: DISCONTINUED | OUTPATIENT
Start: 2021-09-30 | End: 2021-09-26 | Stop reason: HOSPADM

## 2021-09-24 RX ORDER — GAUZE BANDAGE 2" X 2"
100 BANDAGE TOPICAL DAILY
Status: DISCONTINUED | OUTPATIENT
Start: 2021-09-24 | End: 2021-09-26 | Stop reason: HOSPADM

## 2021-09-24 RX ORDER — BENZONATATE 100 MG/1
100 CAPSULE ORAL ONCE
Status: COMPLETED | OUTPATIENT
Start: 2021-09-24 | End: 2021-09-24

## 2021-09-24 RX ORDER — ACETAMINOPHEN 650 MG/1
650 SUPPOSITORY RECTAL EVERY 6 HOURS PRN
Status: DISCONTINUED | OUTPATIENT
Start: 2021-09-24 | End: 2021-09-26 | Stop reason: HOSPADM

## 2021-09-24 RX ORDER — 0.9 % SODIUM CHLORIDE 0.9 %
2000 INTRAVENOUS SOLUTION INTRAVENOUS ONCE
Status: COMPLETED | OUTPATIENT
Start: 2021-09-24 | End: 2021-09-24

## 2021-09-24 RX ORDER — ACETAMINOPHEN 325 MG/1
650 TABLET ORAL EVERY 6 HOURS PRN
Status: DISCONTINUED | OUTPATIENT
Start: 2021-09-24 | End: 2021-09-26 | Stop reason: HOSPADM

## 2021-09-24 RX ORDER — SODIUM CHLORIDE 9 MG/ML
25 INJECTION, SOLUTION INTRAVENOUS PRN
Status: DISCONTINUED | OUTPATIENT
Start: 2021-09-24 | End: 2021-09-26 | Stop reason: HOSPADM

## 2021-09-24 RX ORDER — MECOBALAMIN 5000 MCG
5 TABLET,DISINTEGRATING ORAL NIGHTLY PRN
Status: DISCONTINUED | OUTPATIENT
Start: 2021-09-24 | End: 2021-09-26 | Stop reason: HOSPADM

## 2021-09-24 RX ORDER — VITAMIN B COMPLEX
2000 TABLET ORAL DAILY
Status: DISCONTINUED | OUTPATIENT
Start: 2021-10-01 | End: 2021-09-26 | Stop reason: HOSPADM

## 2021-09-24 RX ORDER — VITAMIN E 268 MG
400 CAPSULE ORAL DAILY
Status: DISCONTINUED | OUTPATIENT
Start: 2021-09-24 | End: 2021-09-26 | Stop reason: HOSPADM

## 2021-09-24 RX ORDER — KETOROLAC TROMETHAMINE 15 MG/ML
15 INJECTION, SOLUTION INTRAMUSCULAR; INTRAVENOUS ONCE
Status: COMPLETED | OUTPATIENT
Start: 2021-09-24 | End: 2021-09-24

## 2021-09-24 RX ORDER — DEXAMETHASONE SODIUM PHOSPHATE 10 MG/ML
6 INJECTION INTRAMUSCULAR; INTRAVENOUS ONCE
Status: COMPLETED | OUTPATIENT
Start: 2021-09-24 | End: 2021-09-24

## 2021-09-24 RX ORDER — SODIUM CHLORIDE 0.9 % (FLUSH) 0.9 %
5-40 SYRINGE (ML) INJECTION EVERY 12 HOURS SCHEDULED
Status: DISCONTINUED | OUTPATIENT
Start: 2021-09-24 | End: 2021-09-26 | Stop reason: HOSPADM

## 2021-09-24 RX ADMIN — Medication 6000 UNITS: at 20:36

## 2021-09-24 RX ADMIN — ZINC SULFATE 220 MG (50 MG) CAPSULE 50 MG: CAPSULE at 20:36

## 2021-09-24 RX ADMIN — Medication 5 MG: at 20:36

## 2021-09-24 RX ADMIN — BENZOCAINE AND MENTHOL, UNSPECIFIED FORM 1 LOZENGE: 15; 3.6 LOZENGE ORAL at 20:36

## 2021-09-24 RX ADMIN — SODIUM CHLORIDE 2000 ML: 9 INJECTION, SOLUTION INTRAVENOUS at 15:17

## 2021-09-24 RX ADMIN — IOPAMIDOL 75 ML: 755 INJECTION, SOLUTION INTRAVENOUS at 17:04

## 2021-09-24 RX ADMIN — KETOROLAC TROMETHAMINE 15 MG: 15 INJECTION, SOLUTION INTRAMUSCULAR; INTRAVENOUS at 15:18

## 2021-09-24 RX ADMIN — DEXAMETHASONE SODIUM PHOSPHATE 6 MG: 10 INJECTION INTRAMUSCULAR; INTRAVENOUS at 17:43

## 2021-09-24 RX ADMIN — THIAMINE HCL TAB 100 MG 100 MG: 100 TAB at 20:36

## 2021-09-24 RX ADMIN — ONDANSETRON 8 MG: 2 INJECTION INTRAMUSCULAR; INTRAVENOUS at 15:19

## 2021-09-24 RX ADMIN — WATER 1000 MG: 1 INJECTION INTRAMUSCULAR; INTRAVENOUS; SUBCUTANEOUS at 20:36

## 2021-09-24 RX ADMIN — OXYCODONE HYDROCHLORIDE AND ACETAMINOPHEN 1000 MG: 500 TABLET ORAL at 20:35

## 2021-09-24 RX ADMIN — Medication 10 ML: at 20:37

## 2021-09-24 RX ADMIN — ENOXAPARIN SODIUM 40 MG: 40 INJECTION SUBCUTANEOUS at 20:36

## 2021-09-24 RX ADMIN — AZITHROMYCIN DIHYDRATE 500 MG: 500 INJECTION, POWDER, LYOPHILIZED, FOR SOLUTION INTRAVENOUS at 23:16

## 2021-09-24 RX ADMIN — BENZONATATE 100 MG: 100 CAPSULE ORAL at 15:18

## 2021-09-24 RX ADMIN — Medication 400 UNITS: at 20:36

## 2021-09-24 RX ADMIN — PYRIDOXINE HCL TAB 50 MG 50 MG: 50 TAB at 20:36

## 2021-09-24 ASSESSMENT — ENCOUNTER SYMPTOMS
ANAL BLEEDING: 0
SHORTNESS OF BREATH: 1
NAUSEA: 1
SORE THROAT: 0
VOMITING: 1
ABDOMINAL PAIN: 0
COUGH: 1
DIARRHEA: 1
BACK PAIN: 0
BLOOD IN STOOL: 0

## 2021-09-24 ASSESSMENT — PAIN SCALES - GENERAL: PAINLEVEL_OUTOF10: 0

## 2021-09-24 NOTE — PROGRESS NOTES
Pharmacy Documentation     Medication: Baricitinib     Date: 9/24/21  Physician: Dr. Lowery Overall consulted to initiate Baricitinib. Patient does not currently meet [facility] P&T approved Baricitinib Criteria for Use to initiate medication: severe hepatic impairment. Discussed with Dr. Mariaelena Ellington, pharmacy will continue to follow. Please re-consult if the clinical condition changes and patient meets criteria for initiation based on [facility] P&T approved Baricitinib Criteria for Use.       Thank you for this consult,  Hermilo Hu [facility]

## 2021-09-24 NOTE — ED NOTES
Bed: 09  Expected date:   Expected time:   Means of arrival:   Comments:  terminal     Viki Blake RN  09/24/21 [id number]

## 2021-09-24 NOTE — H&P
Department of Internal Medicine  History and Physical    PCP: Uma Hawkins DO\  Admitting Physician: Dr. Reginaldo Olson  Consultants:   Date of Service: 9/24/2021    CHIEF COMPLAINT: Shortness of breath and cough     HISTORY OF PRESENT ILLNESS:    Patient is 42-year-old male who presents to the ED due to increased shortness of breath. Patient tested positive for COVID-19 about a week ago. Patient initially had issues with nausea and emesis. Decreased appetite. He began to have increased shortness of breath. He also admits to productive cough with mild sputum. He admits to fever and chills. PAST MEDICAL Hx:  History reviewed. No pertinent past medical history. PAST SURGICAL Hx:   Past Surgical History:   Procedure Laterality Date    NECK SURGERY         FAMILY Hx:  Family History   Problem Relation Age of Onset    Other Father         kidney issues       HOME MEDICATIONS:  Prior to Admission medications    Not on File       ALLERGIES:  Patient has no known allergies. SOCIAL Hx:  Social History     Socioeconomic History    Marital status: Single     Spouse name: Not on file    Number of children: Not on file    Years of education: Not on file    Highest education level: Not on file   Occupational History    Not on file   Tobacco Use    Smoking status: Never Smoker    Smokeless tobacco: Never Used   Substance and Sexual Activity    Alcohol use: Yes     Comment: occ    Drug use: No    Sexual activity: Not on file   Other Topics Concern    Not on file   Social History Narrative    Not on file     Social Determinants of Health     Financial Resource Strain:     Difficulty of Paying Living Expenses:    Food Insecurity:     Worried About Running Out of Food in the Last Year:     [address] in the Last Year:    Transportation Needs:     Lack of Transportation (Medical):      Lack of Transportation (Non-Medical):    Physical Activity:     Days of Exercise per Week:     Minutes of Exercise per Session:    Stress:     Feeling of Stress :    Social Connections:     Frequency of Communication with Friends and Family:     Frequency of Social Gatherings with Friends and Family:     Attends [employer] Services:     Active Member of Clubs or Organizations:     Attends Club or Organization Meetings:     Marital Status:    Intimate Partner Violence:     Fear of Current or Ex-Partner:     Emotionally Abused:     Physically Abused:     Sexually Abused:        ROS: Positive in bold  General:   Denies chills, fatigue, fever, malaise, night sweats or weight loss    Psychological:   Denies anxiety, disorientation or hallucinations    ENT:    Denies epistaxis, headaches, vertigo or visual changes    Cardiovascular:   Denies any chest pain, irregular heartbeats, or palpitations. No paroxysmal nocturnal dyspnea. Respiratory:   Denies shortness of breath, coughing, sputum production, hemoptysis, or wheezing. No orthopnea. Gastrointestinal:   Denies nausea, vomiting, diarrhea, or constipation. Denies any abdominal pain. Denies change in bowel habits or stools. Genito-Urinary:    Denies any urgency, frequency, hematuria. Voiding without difficulty. Musculoskeletal:   Denies joint pain, joint stiffness, joint swelling or muscle pain    Neurology:    Denies any headache or focal neurological deficits. No weakness or paresthesia. Derm:    Denies any rashes, ulcers, or excoriations. Denies bruising. Extremities:   Denies any lower extremity swelling or edema. PHYSICAL EXAM: Abnormal findings noted  VITALS:  Vitals:    09/24/21 1741   BP:    Pulse: 109   Resp: 22   Temp:    SpO2: 97%         CONSTITUTIONAL:    Awake, alert, cooperative, no apparent distress, and appears stated age    EYES:     EOMI, sclera clear, conjunctiva normal    ENT:    Normocephalic, External ears without lesions.      NECK:    Supple, symmetrical, trachea midline, , no JVD    HEMATOLOGIC/LYMPHATICS: No cervical lymphadenopathy and no supraclavicular lymphadenopathy    LUNGS:    Symmetric. No increased work of breathing, good air exchange, clear to auscultation bilaterally, no wheezes, rhonchi, or rales,   Patient has diminished breath sound bilaterally    CARDIOVASCULAR:    Normal apical impulse, regular rate and rhythm, normal S1 and S2, no S3 or S4, and no murmur noted    ABDOMEN:    soft, non-distended, non-tender    MUSCULOSKELETAL:    There is no redness, warmth, or swelling of the joints. NEUROLOGIC:    Awake, alert, oriented to name, place and time. SKIN:    No bruising or bleeding. No redness, warmth, or swelling    EXTREMITIES:    Peripheral pulses present. No edema, cyanosis, or swelling. LINES/CATHETERS     LABORATORY DATA:  CBC with Differential:    Lab Results   Component Value Date    WBC 6.2 09/24/2021    RBC 4.67 09/24/2021    HGB 14.8 09/24/2021    HCT 43.0 09/24/2021     09/24/2021    MCV 92.1 09/24/2021    MCH 31.7 09/24/2021    MCHC 34.4 09/24/2021    RDW 11.9 09/24/2021    SEGSPCT 73 07/07/2011    LYMPHOPCT 11.3 09/24/2021    MONOPCT 13.0 09/24/2021    BASOPCT 0.2 09/24/2021    MONOSABS 0.81 09/24/2021    LYMPHSABS 0.74 09/24/2021    EOSABS 0.00 09/24/2021    BASOSABS 0.00 09/24/2021     CMP:    Lab Results   Component Value Date     09/24/2021    K 3.7 09/24/2021    CL 92 09/24/2021    CO2 26 09/24/2021    BUN 16 09/24/2021    CREATININE 1.0 09/24/2021    GFRAA >60 09/24/2021    LABGLOM >60 09/24/2021    GLUCOSE 124 09/24/2021    GLUCOSE 104 07/07/2011    PROT 8.4 09/24/2021    LABALBU 4.2 09/24/2021    LABALBU 5.0 07/07/2011    CALCIUM 9.6 09/24/2021    BILITOT 1.0 09/24/2021    ALKPHOS 89 09/24/2021     09/24/2021     09/24/2021       ASSESSMENT/PLAN:  1. acute hypoxic respiratory failure  2. COVID-19 with bilateral pneumonia  3. Transaminitis  4. Patient presented due to progressive COVID-19 symptoms. He was found to be hypoxic.   CTA showed multifocal pneumonia but no PE. However study not optimal.  Patient will be started on dexamethasone. He will be started on IV antibiotics. Cultures will be obtained. He was counseled on prone positioning and incentive spirometry use.     Nikkie Acevedo  6:37 PM  9/24/2021    Electronically signed by Tamiko Paul DO on 9/24/21 at 6:37 PM EDT

## 2021-09-24 NOTE — ED PROVIDER NOTES
This is a 50-year-old male presenting to the emergency department for about 14 days of viral symptoms. Patient has had dry cough, chills, nausea and vomiting, dyspnea, tested positive for COVID-19 about a week ago. Patient works [employer], was not vaccinated for COVID-19. Patient states that for the past week he has been vomiting, unable to keep much down, has had loss of appetite as well as emesis. He was initially having diarrhea but has not had much recently because of the lack of p.o. intake. He has not had any chest pain, but has had dyspnea, worse with coughing fits. Patient with no leg pain or swelling, no hemoptysis, no hormone use, no history of DVT or PE, no history of malignancy or recent surgery. Patient presenting with shortness of breath, this is worse exertion and coughing, improved by nothing, moderate severity, constant. The history is provided by the patient and medical records. Review of Systems   Constitutional: Positive for chills and fatigue. Negative for fever. HENT: Positive for congestion. Negative for sore throat. Eyes: Negative for visual disturbance. Respiratory: Positive for cough and shortness of breath. Cardiovascular: Negative for chest pain, palpitations and leg swelling. Gastrointestinal: Positive for diarrhea, nausea and vomiting. Negative for abdominal pain, anal bleeding and blood in stool. Genitourinary: Negative for dysuria and flank pain. Musculoskeletal: Negative for back pain and neck pain. Skin: Negative for rash and wound. Neurological: Negative for syncope and light-headedness. Physical Exam  Vitals and nursing note reviewed. Constitutional:       Appearance: He is not ill-appearing, toxic-appearing or diaphoretic. HENT:      Head: Normocephalic and atraumatic. Right Ear: External ear normal.      Left Ear: External ear normal.   Eyes:      General: No scleral icterus.      Extraocular Movements: Extraocular movements intact. Conjunctiva/sclera: Conjunctivae normal.   Cardiovascular:      Rate and Rhythm: Regular rhythm. Tachycardia present. Pulses: Normal pulses. Heart sounds: Normal heart sounds. Pulmonary:      Effort: Pulmonary effort is normal. No respiratory distress. Breath sounds: No stridor. No wheezing or rhonchi. Comments: Scattered crackles, worse on the right than the left and posterior lung fields. Chest:      Chest wall: No tenderness. Abdominal:      General: Abdomen is flat. There is no distension. Palpations: Abdomen is soft. There is no mass. Tenderness: There is no abdominal tenderness. There is no right CVA tenderness, left CVA tenderness, guarding or rebound. Hernia: No hernia is present. Musculoskeletal:         General: No swelling or deformity. Cervical back: Normal range of motion and neck supple. Right lower leg: No edema. Left lower leg: No edema. Skin:     General: Skin is warm and dry. Capillary Refill: Capillary refill takes less than 2 seconds. Neurological:      General: No focal deficit present. Mental Status: He is alert. Psychiatric:         Mood and Affect: Mood normal.         Behavior: Behavior normal.         Thought Content: Thought content normal.         Judgment: Judgment normal.          Procedures     MDM  Number of Diagnoses or Management Options  Acute hypoxemic respiratory failure due to COVID-19 ([facility] [address]  COVID-19  Pneumonia due to 2019-nCoV  Transaminitis  Diagnosis management comments: This is a 56-year-old male presenting to the emergency department for ongoing fatigue, dyspnea, nausea and vomiting and coughing related to COVID-19 infection. He originally started getting symptoms about 2 weeks ago, tested positive about 5 or 6 days ago. He has no underlying cardiopulmonary issues, does not smoke, was not vaccinated for COVID-19.   Initial arrival patient is mildly hypertensive, afebrile, markedly tachycardic with a heart rate of 120-130, but in no respiratory distress and satting 96% on room air. Given patient's significant tachycardia, COVID-19 infection, patient's low risk Wells PE, will obtain D-dimer. Suspect patient's tachycardia related to dehydration given his poor p.o. intake and nausea and vomiting, will symptomatically treat with IV fluids, antiemetics, anti-inflammatories, and antitussives. Given patient's decreased p.o. intake and nausea and vomiting, will obtain lab work including electrolytes and kidney function. Patient with reassuring lab work, mild transaminitis, likely related to patient's COVID-19 infection. Patient with mildly elevated D-dimer, therefore CTA was ordered. Patient's vital signs improved after IV fluids, tachycardia resolved, patient afebrile and normotensive, no respiratory distress. CTA with no evidence of pulmonary embolism, but with evidence of multifocal pneumonia consistent with COVID-19 pneumonia. While ambulating patient dropped to 88% on room air, was placed on 2 L of O2 via nasal cannula. Because of this patient will be admitted to the hospital for further monitoring, treatment, work-up. ED Course as of Sep 26 1001   Fri Sep 24, 2021   1623 EKG: This EKG is signed and interpreted by me. Rate: 117  Rhythm: Sinus  Interpretation: sinus tachycardia  Comparison: no previous EKG      [RH]   1623 Patient signed out to Dr. Rupert Bello. Patient is pending CTA of chest, ambulatory pulse ox. Patient likely will be able to be discharged home with supportive treatment with antiemetics, anti-inflammatories and antitussives. [RH]   1702 Patient signed out to me. Awaiting CTA pulmonary with contrast.    [JV]      ED Course User Index  [JV] Helen Mae MD  [RH] [address],      ED Course as of Sep 26 1001   Fri Sep 24, 2021   1623 EKG: This EKG is signed and interpreted by me.     Rate: 117  Rhythm: Sinus  Interpretation: sinus tachycardia  Comparison: no previous EKG      [RH]   1623 Patient signed out to Dr. Elisabet Dunbar. Patient is pending CTA of chest, ambulatory pulse ox. Patient likely will be able to be discharged home with supportive treatment with antiemetics, anti-inflammatories and antitussives. [RH]   1702 Patient signed out to me. Awaiting CTA pulmonary with contrast.    [JV]      ED Course User Index  [JV] Keerthi Singer MD  [RH] Ryley Sammye Ja, DO       --------------------------------------------- PAST HISTORY ---------------------------------------------  Past Medical History:  has no past medical history on file. Past Surgical History:  has a past surgical history that includes Neck surgery. Social History:  reports that he has never smoked. He has never used smokeless tobacco. He reports current alcohol use. He reports that he does not use drugs. Family History: family history includes Other in his father. The patients home medications have been reviewed. Allergies: Patient has no known allergies. -------------------------------------------------- RESULTS -------------------------------------------------    LABS:  Results for orders placed or performed during the hospital encounter of 09/24/21   Legionella antigen, urine    Specimen: Urine, clean catch   Result Value Ref Range    L. pneumophila Serogp 1 Ur Ag       Presumptive Negative -suggesting no recent or current infections  with Legionella pneumophila serogroup 1. Infection to Legionella cannot be ruled out since other serogroups  and species may cause infection, antigen may not be present in  early infection, or level of antigen may be below the  detection limit.   Normal Range: Presumptive Negative     Strep Pneumoniae Antigen    Specimen: Urine, clean catch   Result Value Ref Range    STREP PNEUMONIAE ANTIGEN, URINE       Presumptive negative- suggests no current or recent  pneumococcal infection.   Infection due to Strep pneumoniae cannot be  ruled out since the antigen present in the sample  may be below the detection limit of the test.  Normal Range:Presumptive Negative     Culture, Respiratory, Sputum    Specimen: Sputum Expectorated   Result Value Ref Range    Smear, Respiratory       Group 6: <25 PMN's/LPF and <25 Epithelial cells/LPF  Rare Polymorphonuclear leukocytes  Few Epithelial cells  Few Gram positive cocci in clusters  Rare Gram negative rods  Rare gram positive rods Diphtheroid like  Rare Gram positive diplococci  Few Gram negative diplococci     CBC Auto Differential   Result Value Ref Range    WBC 6.2 4.5 - 11.5 E9/L    RBC 4.67 3.80 - 5.80 E12/L    Hemoglobin 14.8 12.5 - 16.5 g/dL    Hematocrit 43.0 37.0 - 54.0 %    MCV 92.1 80.0 - 99.9 fL    MCH 31.7 26.0 - 35.0 pg    MCHC 34.4 32.0 - 34.5 %    RDW 11.9 11.5 - 15.0 fL    Platelets 153 548 - 783 E9/L    MPV 11.3 7.0 - 12.0 fL    Neutrophils % 74.8 43.0 - 80.0 %    Lymphocytes % 11.3 (L) 20.0 - 42.0 %    Monocytes % 13.0 (H) 2.0 - 12.0 %    Eosinophils % 0.0 0.0 - 6.0 %    Basophils % 0.2 0.0 - 2.0 %    Neutrophils Absolute 4.65 1.80 - 7.30 E9/L    Lymphocytes Absolute 0.74 (L) 1.50 - 4.00 E9/L    Monocytes Absolute 0.81 0.10 - 0.95 E9/L    Eosinophils Absolute 0.00 (L) 0.05 - 0.50 E9/L    Basophils Absolute 0.00 0.00 - 0.20 E9/L    Atypical Lymphocytes Relative 0.9 0.0 - 4.0 %    RBC Morphology Normal    Comprehensive Metabolic Panel w/ Reflex to MG   Result Value Ref Range    Sodium 131 (L) 132 - 146 mmol/L    Potassium reflex Magnesium 3.7 3.5 - 5.0 mmol/L    Chloride 92 (L) 98 - 107 mmol/L    CO2 26 22 - 29 mmol/L    Anion Gap 13 7 - 16 mmol/L    Glucose 124 (H) 74 - 99 mg/dL    BUN 16 6 - 20 mg/dL    CREATININE 1.0 0.7 - 1.2 mg/dL    GFR Non-African American >60 >=60 mL/min/1.73    GFR African American >60     Calcium 9.6 8.6 - 10.2 mg/dL    Total Protein 8.4 (H) 6.4 - 8.3 g/dL    Albumin 4.2 3.5 - 5.2 g/dL    Total Bilirubin 1.0 0.0 - 1.2 mg/dL    Alkaline Phosphatase 89 40 - 129 U/L     (H) 0 - 40 U/L     (H) 0 - 39 U/L   D-DIMER, QUANTITATIVE   Result Value Ref Range    D-Dimer, Quant 395 ng/mL DDU   C-reactive protein   Result Value Ref Range    CRP 4.7 (H) 0.0 - 0.4 mg/dL   FERRITIN   Result Value Ref Range    Ferritin 4,850 ng/mL   Fibrinogen   Result Value Ref Range    Fibrinogen >700 (H) 225 - 540 mg/dL   Lactate dehydrogenase   Result Value Ref Range    LD 1,450 (H) 135 - 225 U/L   Procalcitonin   Result Value Ref Range    Procalcitonin 0.25 (H) 0.00 - 0.08 ng/mL   CBC Auto Differential   Result Value Ref Range    WBC 6.0 4.5 - 11.5 E9/L    RBC 4.50 3.80 - 5.80 E12/L    Hemoglobin 14.0 12.5 - 16.5 g/dL    Hematocrit 42.8 37.0 - 54.0 %    MCV 95.1 80.0 - 99.9 fL    MCH 31.1 26.0 - 35.0 pg    MCHC 32.7 32.0 - 34.5 %    RDW 11.9 11.5 - 15.0 fL    Platelets 107 418 - 586 E9/L    MPV 11.8 7.0 - 12.0 fL    Neutrophils % 71.7 43.0 - 80.0 %    Lymphocytes % 20.4 20.0 - 42.0 %    Monocytes % 8.0 2.0 - 12.0 %    Eosinophils % 0.0 0.0 - 6.0 %    Basophils % 0.2 0.0 - 2.0 %    Neutrophils Absolute 4.32 1.80 - 7.30 E9/L    Lymphocytes Absolute 1.20 (L) 1.50 - 4.00 E9/L    Monocytes Absolute 0.48 0.10 - 0.95 E9/L    Eosinophils Absolute 0.00 (L) 0.05 - 0.50 E9/L    Basophils Absolute 0.00 0.00 - 0.20 E9/L   Comprehensive Metabolic Panel   Result Value Ref Range    Sodium 136 132 - 146 mmol/L    Potassium 4.2 3.5 - 5.0 mmol/L    Chloride 100 98 - 107 mmol/L    CO2 25 22 - 29 mmol/L    Anion Gap 11 7 - 16 mmol/L    Glucose 115 (H) 74 - 99 mg/dL    BUN 16 6 - 20 mg/dL    CREATININE 0.9 0.7 - 1.2 mg/dL    GFR Non-African American >60 >=60 mL/min/1.73    GFR African American >60     Calcium 8.8 8.6 - 10.2 mg/dL    Total Protein 8.0 6.4 - 8.3 g/dL    Albumin 3.9 3.5 - 5.2 g/dL    Total Bilirubin 0.8 0.0 - 1.2 mg/dL    Alkaline Phosphatase 86 40 - 129 U/L     (H) 0 - 40 U/L     (H) 0 - 39 U/L   Magnesium   Result Value Ref Range    Magnesium 2.6 1.6 - 2.6 mg/dL   Phosphorus   Result Value Ref Range    Phosphorus 4.3 2.5 - 4.5 mg/dL   T4, Free   Result Value Ref Range    T4 Free 1.36 0.93 - 1.70 ng/dL   TSH without Reflex   Result Value Ref Range    TSH 0.729 0.270 - 4.200 uIU/mL   Vitamin D 25 Hydroxy   Result Value Ref Range    Vit D, 25-Hydroxy 11 (L) 30 - 100 ng/mL   Lactic Acid, Plasma   Result Value Ref Range    Lactic Acid 1.2 0.5 - 2.2 mmol/L   Troponin   Result Value Ref Range    Troponin, High Sensitivity <6 0 - 11 ng/L   D-Dimer, Quantitative   Result Value Ref Range    D-Dimer, Quant 321 ng/mL DDU   C-Reactive Protein   Result Value Ref Range    CRP 3.9 (H) 0.0 - 0.4 mg/dL   Fibrinogen   Result Value Ref Range    Fibrinogen >700 (H) 225 - 540 mg/dL   APTT   Result Value Ref Range    aPTT 29.3 24.5 - 35.1 sec   Protime-INR   Result Value Ref Range    Protime 13.7 (H) 9.3 - 12.4 sec    INR 1.2    Procalcitonin   Result Value Ref Range    Procalcitonin 0.17 (H) 0.00 - 0.08 ng/mL   Miscellaneous Sendout 1   Result Value Ref Range    test code IL 6 DO     This Test Sent To [facility]    CBC Auto Differential   Result Value Ref Range    WBC 5.3 4.5 - 11.5 E9/L    RBC 4.17 3.80 - 5.80 E12/L    Hemoglobin 13.0 12.5 - 16.5 g/dL    Hematocrit 39.8 37.0 - 54.0 %    MCV 95.4 80.0 - 99.9 fL    MCH 31.2 26.0 - 35.0 pg    MCHC 32.7 32.0 - 34.5 %    RDW 12.0 11.5 - 15.0 fL    Platelets 660 418 - 401 E9/L    MPV 11.5 7.0 - 12.0 fL    Neutrophils % 48.7 43.0 - 80.0 %    Lymphocytes % 40.0 20.0 - 42.0 %    Monocytes % 10.4 2.0 - 12.0 %    Eosinophils % 0.0 0.0 - 6.0 %    Basophils % 0.2 0.0 - 2.0 %    Neutrophils Absolute 2.65 1.80 - 7.30 E9/L    Lymphocytes Absolute 2.12 1.50 - 4.00 E9/L    Monocytes Absolute 0.53 0.10 - 0.95 E9/L    Eosinophils Absolute 0.00 (L) 0.05 - 0.50 E9/L    Basophils Absolute 0.00 0.00 - 0.20 E9/L    Myelocyte Percent 0.9 0 - 0 %    nRBC 1.7 /100 WBC   Comprehensive Metabolic Panel   Result Value Ref Range    Sodium ------------------------- NURSING NOTES AND VITALS REVIEWED ---------------------------  Date / Time Roomed:  9/24/2021  2:35 PM  ED Bed Assignment:  [id number]    The nursing notes within the ED encounter and vital signs as below have been reviewed. Patient Vitals for the past 24 hrs:   BP Temp Temp src Pulse Resp SpO2   09/26/21 0738 -- -- -- -- -- 100 %   09/26/21 0530 110/65 97.3 °F (36.3 °C) Infrared 78 16 98 %   09/25/21 2000 116/76 97.8 °F (36.6 °C) Infrared 94 18 98 %   09/25/21 1839 -- -- -- -- -- 100 %   09/25/21 1430 116/73 97.7 °F (36.5 °C) Infrared 91 20 97 %   09/25/21 1320 -- -- -- -- -- 97 %   09/25/21 1315 -- -- -- -- -- 99 %       Oxygen Saturation Interpretation: Normal, abnormal with ambulation    ------------------------------------------ PROGRESS NOTES ------------------------------------------  Re-evaluation(s):  See ED COURSE    Counseling:  I have spoken with the patient and discussed todays results, in addition to providing specific details for the plan of care and counseling regarding the diagnosis and prognosis. Their questions are answered at this time and they are agreeable with the plan of admission.    --------------------------------- ADDITIONAL PROVIDER NOTES ---------------------------------  Consultations:  See ED COURSE  This patient's ED course included: a personal history and physicial examination, re-evaluation prior to disposition, multiple bedside re-evaluations, IV medications, cardiac monitoring and continuous pulse oximetry    This patient has remained hemodynamically stable during their ED course. Diagnosis:  1. Acute hypoxemic respiratory failure due to COVID-19 ([address]    2. COVID-19    3. Transaminitis    4. Pneumonia due to 2019-nCoV        Disposition:  Patient's disposition: Admit to telemetry  Patient's condition is stable.          [address] Quinn DO  Resident  09/26/21 1001

## 2021-09-24 NOTE — ED NOTES
Report given to  RN from [facility]  Report method by phone   The following was reviewed with receiving RN:   Current vital signs:  BP (!) 161/95   Pulse 105   Temp 97.5 °F (36.4 °C) (Temporal)   Resp 28   Wt 220 lb (99.8 kg)   SpO2 99%   BMI 30.68 kg/m²                      Any medication or safety alerts were reviewed. Any pending diagnostics and notifications were also reviewed, as well as any safety concerns or issues, abnormal labs, abnormal imaging, and abnormal assessment findings. Questions were answered.             Emil Banks RN  09/24/21 [id number]

## 2021-09-24 NOTE — ED NOTES
While ambulating pt with pulse ox, spo2 dropped to 88 an pulse 94, resp 32 an bp 141/91. Tonja Bah RN notified.  Pt was placed on2 liters of oxygen an spo2 is at [address] [facility]  09/24/21 1646

## 2021-09-25 PROBLEM — U07.1 COVID-19: Status: ACTIVE | Noted: 2021-09-25

## 2021-09-25 LAB
ALBUMIN SERPL-MCNC: 3.9 G/DL (ref 3.5–5.2)
ALP BLD-CCNC: 86 U/L (ref 40–129)
ALT SERPL-CCNC: 128 U/L (ref 0–40)
ANION GAP SERPL CALCULATED.3IONS-SCNC: 11 MMOL/L (ref 7–16)
APTT: 29.3 SEC (ref 24.5–35.1)
AST SERPL-CCNC: 183 U/L (ref 0–39)
BASOPHILS ABSOLUTE: 0 E9/L (ref 0–0.2)
BASOPHILS RELATIVE PERCENT: 0.2 % (ref 0–2)
BILIRUB SERPL-MCNC: 0.8 MG/DL (ref 0–1.2)
BUN BLDV-MCNC: 16 MG/DL (ref 6–20)
C-REACTIVE PROTEIN: 3.9 MG/DL (ref 0–0.4)
CALCIUM SERPL-MCNC: 8.8 MG/DL (ref 8.6–10.2)
CHLORIDE BLD-SCNC: 100 MMOL/L (ref 98–107)
CO2: 25 MMOL/L (ref 22–29)
CREAT SERPL-MCNC: 0.9 MG/DL (ref 0.7–1.2)
D DIMER: 321 NG/ML DDU
EKG ATRIAL RATE: 117 BPM
EKG P AXIS: 50 DEGREES
EKG P-R INTERVAL: 142 MS
EKG Q-T INTERVAL: 310 MS
EKG QRS DURATION: 78 MS
EKG QTC CALCULATION (BAZETT): 432 MS
EKG R AXIS: 41 DEGREES
EKG T AXIS: 37 DEGREES
EKG VENTRICULAR RATE: 117 BPM
EOSINOPHILS ABSOLUTE: 0 E9/L (ref 0.05–0.5)
EOSINOPHILS RELATIVE PERCENT: 0 % (ref 0–6)
FIBRINOGEN: >700 MG/DL (ref 225–540)
GFR AFRICAN AMERICAN: >60
GFR NON-AFRICAN AMERICAN: >60 ML/MIN/1.73
GLUCOSE BLD-MCNC: 115 MG/DL (ref 74–99)
HCT VFR BLD CALC: 42.8 % (ref 37–54)
HEMOGLOBIN: 14 G/DL (ref 12.5–16.5)
INR BLD: 1.2
LACTIC ACID: 1.2 MMOL/L (ref 0.5–2.2)
LYMPHOCYTES ABSOLUTE: 1.2 E9/L (ref 1.5–4)
LYMPHOCYTES RELATIVE PERCENT: 20.4 % (ref 20–42)
MAGNESIUM: 2.6 MG/DL (ref 1.6–2.6)
MCH RBC QN AUTO: 31.1 PG (ref 26–35)
MCHC RBC AUTO-ENTMCNC: 32.7 % (ref 32–34.5)
MCV RBC AUTO: 95.1 FL (ref 80–99.9)
MONOCYTES ABSOLUTE: 0.48 E9/L (ref 0.1–0.95)
MONOCYTES RELATIVE PERCENT: 8 % (ref 2–12)
NEUTROPHILS ABSOLUTE: 4.32 E9/L (ref 1.8–7.3)
NEUTROPHILS RELATIVE PERCENT: 71.7 % (ref 43–80)
PDW BLD-RTO: 11.9 FL (ref 11.5–15)
PHOSPHORUS: 4.3 MG/DL (ref 2.5–4.5)
PLATELET # BLD: 247 E9/L (ref 130–450)
PMV BLD AUTO: 11.8 FL (ref 7–12)
POTASSIUM SERPL-SCNC: 4.2 MMOL/L (ref 3.5–5)
PROCALCITONIN: 0.17 NG/ML (ref 0–0.08)
PROTHROMBIN TIME: 13.7 SEC (ref 9.3–12.4)
RBC # BLD: 4.5 E12/L (ref 3.8–5.8)
SODIUM BLD-SCNC: 136 MMOL/L (ref 132–146)
T4 FREE: 1.36 NG/DL (ref 0.93–1.7)
TOTAL PROTEIN: 8 G/DL (ref 6.4–8.3)
TROPONIN, HIGH SENSITIVITY: <6 NG/L (ref 0–11)
TSH SERPL DL<=0.05 MIU/L-ACNC: 0.73 UIU/ML (ref 0.27–4.2)
VITAMIN D 25-HYDROXY: 11 NG/ML (ref 30–100)
WBC # BLD: 6 E9/L (ref 4.5–11.5)

## 2021-09-25 PROCEDURE — 6370000000 HC RX 637 (ALT 250 FOR IP): Performed by: INTERNAL MEDICINE

## 2021-09-25 PROCEDURE — 6360000002 HC RX W HCPCS: Performed by: INTERNAL MEDICINE

## 2021-09-25 PROCEDURE — 2700000000 HC OXYGEN THERAPY PER DAY

## 2021-09-25 PROCEDURE — 85730 THROMBOPLASTIN TIME PARTIAL: CPT

## 2021-09-25 PROCEDURE — 85610 PROTHROMBIN TIME: CPT

## 2021-09-25 PROCEDURE — 84439 ASSAY OF FREE THYROXINE: CPT

## 2021-09-25 PROCEDURE — 83520 IMMUNOASSAY QUANT NOS NONAB: CPT

## 2021-09-25 PROCEDURE — 84484 ASSAY OF TROPONIN QUANT: CPT

## 2021-09-25 PROCEDURE — 94640 AIRWAY INHALATION TREATMENT: CPT

## 2021-09-25 PROCEDURE — 87070 CULTURE OTHR SPECIMN AEROBIC: CPT

## 2021-09-25 PROCEDURE — 83605 ASSAY OF LACTIC ACID: CPT

## 2021-09-25 PROCEDURE — 85025 COMPLETE CBC W/AUTO DIFF WBC: CPT

## 2021-09-25 PROCEDURE — 36415 COLL VENOUS BLD VENIPUNCTURE: CPT

## 2021-09-25 PROCEDURE — 80053 COMPREHEN METABOLIC PANEL: CPT

## 2021-09-25 PROCEDURE — 85378 FIBRIN DEGRADE SEMIQUANT: CPT

## 2021-09-25 PROCEDURE — 2060000000 HC ICU INTERMEDIATE R&B

## 2021-09-25 PROCEDURE — 87206 SMEAR FLUORESCENT/ACID STAI: CPT

## 2021-09-25 PROCEDURE — 2580000003 HC RX 258: Performed by: INTERNAL MEDICINE

## 2021-09-25 PROCEDURE — 85384 FIBRINOGEN ACTIVITY: CPT

## 2021-09-25 PROCEDURE — 84443 ASSAY THYROID STIM HORMONE: CPT

## 2021-09-25 PROCEDURE — 84100 ASSAY OF PHOSPHORUS: CPT

## 2021-09-25 PROCEDURE — 84145 PROCALCITONIN (PCT): CPT

## 2021-09-25 PROCEDURE — 82306 VITAMIN D 25 HYDROXY: CPT

## 2021-09-25 PROCEDURE — 83735 ASSAY OF MAGNESIUM: CPT

## 2021-09-25 PROCEDURE — 86140 C-REACTIVE PROTEIN: CPT

## 2021-09-25 RX ORDER — ARFORMOTEROL TARTRATE 15 UG/2ML
15 SOLUTION RESPIRATORY (INHALATION) 2 TIMES DAILY
Status: DISCONTINUED | OUTPATIENT
Start: 2021-09-25 | End: 2021-09-26 | Stop reason: HOSPADM

## 2021-09-25 RX ORDER — BENZONATATE 100 MG/1
100 CAPSULE ORAL 3 TIMES DAILY PRN
Status: DISCONTINUED | OUTPATIENT
Start: 2021-09-25 | End: 2021-09-26 | Stop reason: HOSPADM

## 2021-09-25 RX ORDER — BUDESONIDE 0.5 MG/2ML
500 INHALANT ORAL 2 TIMES DAILY
Status: DISCONTINUED | OUTPATIENT
Start: 2021-09-25 | End: 2021-09-26 | Stop reason: HOSPADM

## 2021-09-25 RX ADMIN — BUDESONIDE 500 MCG: 0.5 INHALANT RESPIRATORY (INHALATION) at 18:38

## 2021-09-25 RX ADMIN — PYRIDOXINE HCL TAB 50 MG 50 MG: 50 TAB at 09:21

## 2021-09-25 RX ADMIN — AZITHROMYCIN DIHYDRATE 500 MG: 500 INJECTION, POWDER, LYOPHILIZED, FOR SOLUTION INTRAVENOUS at 20:16

## 2021-09-25 RX ADMIN — Medication 10 ML: at 20:39

## 2021-09-25 RX ADMIN — WATER 1000 MG: 1 INJECTION INTRAMUSCULAR; INTRAVENOUS; SUBCUTANEOUS at 20:16

## 2021-09-25 RX ADMIN — DEXAMETHASONE SODIUM PHOSPHATE 20 MG: 10 INJECTION, SOLUTION INTRAMUSCULAR; INTRAVENOUS at 09:35

## 2021-09-25 RX ADMIN — Medication 6000 UNITS: at 09:20

## 2021-09-25 RX ADMIN — THIAMINE HCL TAB 100 MG 100 MG: 100 TAB at 09:21

## 2021-09-25 RX ADMIN — ACETAMINOPHEN 650 MG: 325 TABLET ORAL at 11:00

## 2021-09-25 RX ADMIN — BENZONATATE 100 MG: 100 CAPSULE ORAL at 06:33

## 2021-09-25 RX ADMIN — Medication 400 UNITS: at 09:20

## 2021-09-25 RX ADMIN — ENOXAPARIN SODIUM 40 MG: 40 INJECTION SUBCUTANEOUS at 20:15

## 2021-09-25 RX ADMIN — OXYCODONE HYDROCHLORIDE AND ACETAMINOPHEN 1000 MG: 500 TABLET ORAL at 09:20

## 2021-09-25 RX ADMIN — Medication 10 ML: at 09:21

## 2021-09-25 RX ADMIN — IPRATROPIUM BROMIDE AND ALBUTEROL 1 PUFF: 20; 100 SPRAY, METERED RESPIRATORY (INHALATION) at 11:23

## 2021-09-25 RX ADMIN — BARICITINIB 4 MG: 2 TABLET, FILM COATED ORAL at 14:43

## 2021-09-25 RX ADMIN — ZINC SULFATE 220 MG (50 MG) CAPSULE 50 MG: CAPSULE at 09:21

## 2021-09-25 RX ADMIN — ENOXAPARIN SODIUM 40 MG: 40 INJECTION SUBCUTANEOUS at 09:19

## 2021-09-25 RX ADMIN — ARFORMOTEROL TARTRATE 15 MCG: 15 SOLUTION RESPIRATORY (INHALATION) at 18:38

## 2021-09-25 ASSESSMENT — PAIN DESCRIPTION - PAIN TYPE: TYPE: ACUTE PAIN

## 2021-09-25 ASSESSMENT — PAIN SCALES - GENERAL
PAINLEVEL_OUTOF10: 4
PAINLEVEL_OUTOF10: 2

## 2021-09-25 ASSESSMENT — PAIN DESCRIPTION - DESCRIPTORS: DESCRIPTORS: CONSTANT;DISCOMFORT;SORE

## 2021-09-25 ASSESSMENT — PAIN DESCRIPTION - LOCATION: LOCATION: HEAD

## 2021-09-25 ASSESSMENT — PAIN DESCRIPTION - FREQUENCY: FREQUENCY: INTERMITTENT

## 2021-09-25 NOTE — PROGRESS NOTES
Pharmacy Documentation     Medication: Remdesivir/Baricitinib/Tocilizumab     Date: 9/25/21  Physician: Dr. Vira Mittal consulted to initiate either remdesivir, baricitinib, or tocilizumab. Patient does not currently meet [facility] Remdesivir Criteria for Use to initiate remdesivir based on: symptom onset > 7 days. Patient does not currently meet [facility] Tocilizumab Criteria for Use to initiate remdesivir based on: CRP < 7.5 mg/dL. Baricitinib was not initiated yesterday d/t elevated LFT's that are now trending down. Baricitnib will be initiated @ 4 mg daily x 14 days. Monitor LFT's closely as baricitnib may cause worsening hepatic impairment.       Thank you for this consult,  Ildefonso Guardado, [facility]

## 2021-09-25 NOTE — PROGRESS NOTES
Internal Medicine Progress Note    [facility]    [facility]. Clint Ingram, IRLANDAASUARAVOSusanaISusana Platt D.O., DANAE Mariscal D.O. Omar Jarrell, MSN, APRN, NP-C  Jessica Villa. Vernon Branham, MSN, APRN-CNP     Primary Care Physician: Abi Rangel DO   Admitting Physician:  Deepak Aly DO  Admission date and time: 9/24/2021  2:35 PM    Room:  [address] [place] [address]  Admitting diagnosis: Transaminitis [R74.01]  Multifocal pneumonia [J18.9]  Pneumonia due to 2019-nCoV [U07.1, J12.82]  Acute hypoxemic respiratory failure due to COVID-19 [facility] [U07.1, J96.01]  COVID-19 [U07.1]    Patient Name: Rubia Rojas  MRN: [id number]    Date of Service: 9/25/2021     Subjective:  Pavel Rees is a 38 y.o. male who was seen and examined today,9/25/2021, at the bedside. Pavel Rees scented to [facility] emergency department yesterday with findings of Covid. 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 He is rather ill at this point requiring 4 to 6 L of nasal cannula oxygen. He is receiving maximal Covid protocol. We discussed the absolute need to utilize the incentive spirometer and to engage in frequent repositioning. Review of System:   Constitutional:   Admits to debilitating malaise and fatigue. HEENT:   Denies ear pain, sore throat, sinus or eye problems. Admits to irritation associated with the nasal cannula oxygen. Cardiovascular:   Denies any chest pain, irregular heartbeats, or palpitations. Respiratory:   Admits to extreme shortness of breath both at rest and with exertion. Admits to pain on deep inspiration. Minimal coughing without sputum production. Gastrointestinal:   Admits to a decreased appetite and therefore decreased oral intake. Genitourinary:    Denies any urgency, frequency, hematuria. Voiding  without difficulty. Extremities:   Denies lower extremity swelling, edema or cyanosis.    Neurology:    Denies any headache or focal neurological deficits, Denies generalized weakness or memory difficulty. Psch:   Denies being anxious or depressed. Musculoskeletal:    Denies  myalgias, joint complaints or back pain. Integumentary:   Denies any rashes, ulcers, or excoriations. Denies bruising. Hematologic/Lymphatic:  Denies bruising or bleeding. Physical Exam:  No intake/output data recorded. Intake/Output Summary (Last 24 hours) at 9/25/2021 1223  Last data filed at 9/25/2021 0431  Gross per 24 hour   Intake 2120 ml   Output 400 ml   Net 1720 ml   I/O last 3 completed shifts: In: 2120 [P.O.:120; IV Piggyback:2000]  Out: 400 [Urine:400]  Patient Vitals for the past 96 hrs (Last 3 readings):   Weight   09/24/21 1445 220 lb (99.8 kg)     Vital Signs:   Blood pressure 128/86, pulse 93, temperature 97.1 °F (36.2 °C), temperature source Infrared, resp. rate 20, weight 220 lb (99.8 kg), SpO2 95 %. General appearance:  Alert, responsive, oriented to person, place, and time. Well preserved, alert, no distress. Head:  Normocephalic. No masses, lesions or tenderness. Eyes:  PERRLA. EOMI. Sclera clear. Buccal mucosa moist.  ENT:  Ears normal. Mucosa normal.  Nasal cannula oxygen is in place. Neck:    Supple. Trachea midline. No thyromegaly. No JVD. No bruits. Heart:    Rhythm regular. Rate controlled. No murmurs. Lungs:    Diminished and coarse bilaterally with decreased breath sounds throughout. Abdomen:   Soft. Non-tender. Non-distended. Bowel sounds positive. No organomegaly or masses. No pain on palpation. Extremities:    Peripheral pulses present. No peripheral edema. No ulcers. No cyanosis. No clubbing. Neurologic:    Alert x 3. No focal deficit. Cranial nerves grossly intact. No focal weakness. Psych:   Behavior is normal. Mood appears normal. Speech is not rapid and/or pressured. Musculoskeletal:   Spine ROM normal. Muscular strength intact. Gait not assessed.   Integumentary:  No rashes  Skin normal color and texture. Genitalia/Breast:  Deferred    Medication:  Scheduled Meds:   budesonide  500 mcg Nebulization BID    Arformoterol Tartrate  15 mcg Nebulization BID    enoxaparin  40 mg SubCUTAneous BID    dexamethasone  20 mg IntraVENous Q24H    Followed by   Dannial Redhead ON 9/30/2021] dexamethasone  10 mg IntraVENous Q24H    Vitamin D  6,000 Units Oral Daily    Followed by   Dannial Redhead ON 10/1/2021] Vitamin D  2,000 Units Oral Daily    zinc sulfate  50 mg Oral Daily    vitamin E  400 Units Oral Daily    vitamin C  1,000 mg Oral Daily    thiamine mononitrate  100 mg Oral Daily    vitamin B-6  50 mg Oral Daily    sodium chloride flush  5-40 mL IntraVENous 2 times per day    cefTRIAXone (ROCEPHIN) IV  1,000 mg IntraVENous Q24H    azithromycin  500 mg IntraVENous Q24H     Continuous Infusions:   sodium chloride         Objective Data:  CBC with Differential:    Lab Results   Component Value Date    WBC 6.0 09/25/2021    RBC 4.50 09/25/2021    HGB 14.0 09/25/2021    HCT 42.8 09/25/2021     09/25/2021    MCV 95.1 09/25/2021    MCH 31.1 09/25/2021    MCHC 32.7 09/25/2021    RDW 11.9 09/25/2021    SEGSPCT 73 07/07/2011    LYMPHOPCT 20.4 09/25/2021    MONOPCT 8.0 09/25/2021    BASOPCT 0.2 09/25/2021    MONOSABS 0.48 09/25/2021    LYMPHSABS 1.20 09/25/2021    EOSABS 0.00 09/25/2021    BASOSABS 0.00 09/25/2021     BMP:    Lab Results   Component Value Date     09/25/2021    K 4.2 09/25/2021    K 3.7 09/24/2021     09/25/2021    CO2 25 09/25/2021    BUN 16 09/25/2021    LABALBU 3.9 09/25/2021    LABALBU 5.0 07/07/2011    CREATININE 0.9 09/25/2021    CALCIUM 8.8 09/25/2021    GFRAA >60 09/25/2021    LABGLOM >60 09/25/2021    GLUCOSE 115 09/25/2021    GLUCOSE 104 07/07/2011       Assessment:  1. Severe Covid infection resulting in acute respiratory failure with hypoxia meeting sepsis criteria    Plan:   I am concerned with the patient's overall presentation.   He is extremely short of breath and we will transition inhaler therapy to nebulized respiratory medications. High-dose corticosteroids are being employed and we have asked the pharmacy team to evaluate the administration of baricitinib versus Tocilizumab versus remdesivir. Appropriate vitamin supplementation will be employed. We discussed the absolute need to engage in frequent repositioning and to utilize the incentive spirometer. Antibiotic therapy is being employed and respiratory cultures will be obtained. 2D echocardiogram will be assessed to rule out an underlying cardiomyopathy. We discussed the mortality associated with being unvaccinated and the need to engage in all treatment recommendations including both pharmacologic and nonpharmacologic. He voiced understanding and agreement. Continue current therapy. See orders for further plan of care. Greater than 40 minutes of critical care time was spent with the patient. This time included chart review, , and discussion with those consultants involved in the patient's care. More than 50% of my  time was spent at the bedside counseling/coordinating care with the patient and/or family with face to face contact. This time was spent reviewing notes and laboratory data as well as instructing and counseling the patient. Time I spent with the family or surrogate(s) is included only if the patient was incapable of providing the necessary information or participating in medical decisions. I also discussed the differential diagnosis and all of the proposed management plans with the patient and individuals accompanying the patient. Emiliana Harishsaritha requires this high level of physician care and nursing on the IMC/Telemetry unit due the complexity of decision management and chance of rapid decline or death. Continued cardiac monitoring and higher level of nursing are required. I am readily available for any further decision-making and intervention.      Vicky Celaya DO, IRLANDA IGLESIAS  9/25/2021  12:23 PM

## 2021-09-26 ENCOUNTER — APPOINTMENT (OUTPATIENT)
Dept: ULTRASOUND IMAGING | Age: 36
DRG: 871 | End: 2021-09-26
Payer: COMMERCIAL

## 2021-09-26 VITALS
RESPIRATION RATE: 18 BRPM | OXYGEN SATURATION: 97 % | DIASTOLIC BLOOD PRESSURE: 72 MMHG | WEIGHT: 220 LBS | HEART RATE: 91 BPM | TEMPERATURE: 97.5 F | SYSTOLIC BLOOD PRESSURE: 119 MMHG | BODY MASS INDEX: 30.68 KG/M2

## 2021-09-26 LAB
ALBUMIN SERPL-MCNC: 3.8 G/DL (ref 3.5–5.2)
ALP BLD-CCNC: 70 U/L (ref 40–129)
ALT SERPL-CCNC: 132 U/L (ref 0–40)
ANION GAP SERPL CALCULATED.3IONS-SCNC: 12 MMOL/L (ref 7–16)
AST SERPL-CCNC: 107 U/L (ref 0–39)
BASOPHILS ABSOLUTE: 0 E9/L (ref 0–0.2)
BASOPHILS RELATIVE PERCENT: 0.2 % (ref 0–2)
BILIRUB SERPL-MCNC: 0.9 MG/DL (ref 0–1.2)
BUN BLDV-MCNC: 16 MG/DL (ref 6–20)
C-REACTIVE PROTEIN: 2.1 MG/DL (ref 0–0.4)
CALCIUM SERPL-MCNC: 9.2 MG/DL (ref 8.6–10.2)
CHLORIDE BLD-SCNC: 98 MMOL/L (ref 98–107)
CO2: 25 MMOL/L (ref 22–29)
CREAT SERPL-MCNC: 0.9 MG/DL (ref 0.7–1.2)
D DIMER: 250 NG/ML DDU
EOSINOPHILS ABSOLUTE: 0 E9/L (ref 0.05–0.5)
EOSINOPHILS RELATIVE PERCENT: 0 % (ref 0–6)
FIBRINOGEN: >700 MG/DL (ref 225–540)
GFR AFRICAN AMERICAN: >60
GFR NON-AFRICAN AMERICAN: >60 ML/MIN/1.73
GLUCOSE BLD-MCNC: 103 MG/DL (ref 74–99)
HCT VFR BLD CALC: 39.8 % (ref 37–54)
HEMOGLOBIN: 13 G/DL (ref 12.5–16.5)
L. PNEUMOPHILA SEROGP 1 UR AG: NORMAL
LACTATE DEHYDROGENASE: 893 U/L (ref 135–225)
LYMPHOCYTES ABSOLUTE: 2.12 E9/L (ref 1.5–4)
LYMPHOCYTES RELATIVE PERCENT: 40 % (ref 20–42)
MAGNESIUM: 2.6 MG/DL (ref 1.6–2.6)
MCH RBC QN AUTO: 31.2 PG (ref 26–35)
MCHC RBC AUTO-ENTMCNC: 32.7 % (ref 32–34.5)
MCV RBC AUTO: 95.4 FL (ref 80–99.9)
MONOCYTES ABSOLUTE: 0.53 E9/L (ref 0.1–0.95)
MONOCYTES RELATIVE PERCENT: 10.4 % (ref 2–12)
MYELOCYTE PERCENT: 0.9 % (ref 0–0)
NEUTROPHILS ABSOLUTE: 2.65 E9/L (ref 1.8–7.3)
NEUTROPHILS RELATIVE PERCENT: 48.7 % (ref 43–80)
NUCLEATED RED BLOOD CELLS: 1.7 /100 WBC
PDW BLD-RTO: 12 FL (ref 11.5–15)
PHOSPHORUS: 4.4 MG/DL (ref 2.5–4.5)
PLATELET # BLD: 316 E9/L (ref 130–450)
PMV BLD AUTO: 11.5 FL (ref 7–12)
POTASSIUM SERPL-SCNC: 3.9 MMOL/L (ref 3.5–5)
PRO-BNP: 20 PG/ML (ref 0–125)
PROCALCITONIN: 0.09 NG/ML (ref 0–0.08)
RBC # BLD: 4.17 E12/L (ref 3.8–5.8)
SODIUM BLD-SCNC: 135 MMOL/L (ref 132–146)
STREP PNEUMONIAE ANTIGEN, URINE: NORMAL
TOTAL PROTEIN: 7.7 G/DL (ref 6.4–8.3)
WBC # BLD: 5.3 E9/L (ref 4.5–11.5)

## 2021-09-26 PROCEDURE — 2580000003 HC RX 258: Performed by: INTERNAL MEDICINE

## 2021-09-26 PROCEDURE — 84145 PROCALCITONIN (PCT): CPT

## 2021-09-26 PROCEDURE — 83615 LACTATE (LD) (LDH) ENZYME: CPT

## 2021-09-26 PROCEDURE — 6360000002 HC RX W HCPCS: Performed by: INTERNAL MEDICINE

## 2021-09-26 PROCEDURE — 84100 ASSAY OF PHOSPHORUS: CPT

## 2021-09-26 PROCEDURE — 6370000000 HC RX 637 (ALT 250 FOR IP): Performed by: INTERNAL MEDICINE

## 2021-09-26 PROCEDURE — 83880 ASSAY OF NATRIURETIC PEPTIDE: CPT

## 2021-09-26 PROCEDURE — 85384 FIBRINOGEN ACTIVITY: CPT

## 2021-09-26 PROCEDURE — 86140 C-REACTIVE PROTEIN: CPT

## 2021-09-26 PROCEDURE — 76705 ECHO EXAM OF ABDOMEN: CPT

## 2021-09-26 PROCEDURE — 94640 AIRWAY INHALATION TREATMENT: CPT

## 2021-09-26 PROCEDURE — 80053 COMPREHEN METABOLIC PANEL: CPT

## 2021-09-26 PROCEDURE — 85025 COMPLETE CBC W/AUTO DIFF WBC: CPT

## 2021-09-26 PROCEDURE — 85378 FIBRIN DEGRADE SEMIQUANT: CPT

## 2021-09-26 PROCEDURE — 36415 COLL VENOUS BLD VENIPUNCTURE: CPT

## 2021-09-26 PROCEDURE — 83735 ASSAY OF MAGNESIUM: CPT

## 2021-09-26 RX ORDER — THIAMINE MONONITRATE (VIT B1) 100 MG
100 TABLET ORAL DAILY
Qty: 30 TABLET | Refills: 0 | COMMUNITY
Start: 2021-09-27

## 2021-09-26 RX ORDER — CHOLECALCIFEROL (VITAMIN D3) 50 MCG
2000 TABLET ORAL DAILY
Qty: 30 TABLET | Refills: 0 | COMMUNITY
Start: 2021-10-01

## 2021-09-26 RX ORDER — ZINC SULFATE 50(220)MG
50 CAPSULE ORAL DAILY
Qty: 30 CAPSULE | Refills: 0 | COMMUNITY
Start: 2021-09-27

## 2021-09-26 RX ORDER — BUDESONIDE AND FORMOTEROL FUMARATE DIHYDRATE 160; 4.5 UG/1; UG/1
2 AEROSOL RESPIRATORY (INHALATION) 2 TIMES DAILY
Qty: 10.2 G | Refills: 1 | Status: SHIPPED | OUTPATIENT
Start: 2021-09-26

## 2021-09-26 RX ORDER — ALBUTEROL SULFATE 90 UG/1
2 AEROSOL, METERED RESPIRATORY (INHALATION) 4 TIMES DAILY PRN
Qty: 18 G | Refills: 1 | Status: SHIPPED | OUTPATIENT
Start: 2021-09-26

## 2021-09-26 RX ORDER — BENZONATATE 100 MG/1
100 CAPSULE ORAL 3 TIMES DAILY PRN
Qty: 21 CAPSULE | Refills: 0 | Status: SHIPPED | OUTPATIENT
Start: 2021-09-26 | End: 2021-10-03

## 2021-09-26 RX ORDER — PYRIDOXINE HCL (VITAMIN B6) 50 MG
50 TABLET ORAL DAILY
Qty: 30 TABLET | Refills: 0 | COMMUNITY
Start: 2021-09-27

## 2021-09-26 RX ORDER — VITAMIN E 268 MG
400 CAPSULE ORAL DAILY
Qty: 30 CAPSULE | Refills: 0 | COMMUNITY
Start: 2021-09-27

## 2021-09-26 RX ORDER — MECOBALAMIN 5000 MCG
5 TABLET,DISINTEGRATING ORAL NIGHTLY PRN
Refills: 0 | COMMUNITY
Start: 2021-09-26

## 2021-09-26 RX ORDER — DEXAMETHASONE 6 MG/1
TABLET ORAL
Qty: 15 TABLET | Refills: 0 | Status: SHIPPED | OUTPATIENT
Start: 2021-09-26 | End: 2021-10-06

## 2021-09-26 RX ORDER — AZITHROMYCIN 250 MG/1
250 TABLET, FILM COATED ORAL SEE ADMIN INSTRUCTIONS
Qty: 6 TABLET | Refills: 0 | Status: SHIPPED | OUTPATIENT
Start: 2021-09-26 | End: 2021-10-01

## 2021-09-26 RX ADMIN — ZINC SULFATE 220 MG (50 MG) CAPSULE 50 MG: CAPSULE at 10:20

## 2021-09-26 RX ADMIN — Medication 400 UNITS: at 10:20

## 2021-09-26 RX ADMIN — DEXAMETHASONE SODIUM PHOSPHATE 20 MG: 10 INJECTION, SOLUTION INTRAMUSCULAR; INTRAVENOUS at 10:29

## 2021-09-26 RX ADMIN — Medication 6000 UNITS: at 10:19

## 2021-09-26 RX ADMIN — BARICITINIB 4 MG: 2 TABLET, FILM COATED ORAL at 10:19

## 2021-09-26 RX ADMIN — BUDESONIDE 500 MCG: 0.5 INHALANT RESPIRATORY (INHALATION) at 07:38

## 2021-09-26 RX ADMIN — THIAMINE HCL TAB 100 MG 100 MG: 100 TAB at 10:20

## 2021-09-26 RX ADMIN — PYRIDOXINE HCL TAB 50 MG 50 MG: 50 TAB at 10:20

## 2021-09-26 RX ADMIN — ENOXAPARIN SODIUM 40 MG: 40 INJECTION SUBCUTANEOUS at 10:20

## 2021-09-26 RX ADMIN — ARFORMOTEROL TARTRATE 15 MCG: 15 SOLUTION RESPIRATORY (INHALATION) at 07:37

## 2021-09-26 RX ADMIN — OXYCODONE HYDROCHLORIDE AND ACETAMINOPHEN 1000 MG: 500 TABLET ORAL at 10:20

## 2021-09-26 RX ADMIN — Medication 10 ML: at 10:24

## 2021-09-26 RX ADMIN — BENZONATATE 100 MG: 100 CAPSULE ORAL at 10:20

## 2021-09-26 NOTE — PROGRESS NOTES
Ultrasound tech called to see if patient was NPO for gall bladder scan, patient was not NPO, ultrasound stated to reschedule scan for morning and keep patient NPO tonight.

## 2021-09-26 NOTE — PROGRESS NOTES
Attempted to contact pt's mother Pop Back for AM update. Unable to reach Archana at this time, voicemail not set up at this time unable to leave message.  Electronically signed by Liberty Fothergill, RN on 9/26/2021 at 6:51 AM

## 2021-09-26 NOTE — DISCHARGE SUMMARY
Internal Medicine Progress Note     [facility]     Eladia Bruno. Lisandro Pagan., TOMASA.MAXIMOOSusanaI. Belle Dixon D.O., WINTEROAMARILIS. Chancy Bumpers, D.O. Lyndon Butler, MSN, APRN, NP-C  Emil Wood. Arcenio Cm, MSN, APRN-CNP       Internal Medicine  Discharge Summary    NAME: Abdi Martinez  :  1985  MRN:  [id number]  PCP:Tani Womack DO  ADMITTED: 2021      DISCHARGED: 21    ADMITTING PHYSICIAN: Eladia Burns DO    CONSULTANT(S):   IP CONSULT TO PHARMACY  IP CONSULT TO PHARMACY     ADMITTING DIAGNOSIS:   Transaminitis [R74.01]  Multifocal pneumonia [J18.9]  Pneumonia due to 2019-nCoV [U07.1, J12.82]  Acute hypoxemic respiratory failure due to COVID-19 (HCC) [U07.1, J96.01]  COVID-19 [U07.1]     DISCHARGE DIAGNOSES:   1. Severe Covid infection resulting in acute respiratory failure with hypoxia meeting sepsis criteria  2. Transaminitis in the setting of acute Covid infection with normal liver/gallbladder ultrasound    BRIEF HISTORY OF PRESENT ILLNESS:   Patient is 50-year-old male who presents to the ED due to increased shortness of breath. Patient tested positive for COVID-19 about a week ago. Patient initially had issues with nausea and emesis. Decreased appetite. He began to have increased shortness of breath. He also admits to productive cough with mild sputum. He admits to fever and chills.     LABS[de-identified]  Lab Results   Component Value Date    WBC 5.3 2021    HGB 13.0 2021    HCT 39.8 2021     2021     2021    K 3.9 2021    CL 98 2021    CREATININE 0.9 2021    BUN 16 2021    CO2 25 2021    GLUCOSE 103 (H) 2021     (H) 2021     (H) 2021    INR 1.2 2021     Lab Results   Component Value Date    INR 1.2 2021    PROTIME 13.7 (H) 2021      Lab Results   Component Value Date    TSH 0.729 2021     Lab Results   Component Value Date    TRIG 1,882 (H) 04/09/2018    TRIG 414 (H) 05/09/2017    TRIG 87 03/11/2011     Lab Results   Component Value Date    HDL 16 04/09/2018    HDL 23 05/09/2017    HDL 43.0 03/11/2011     Lab Results   Component Value Date    LDLCALC - (AA) 04/09/2018    [address] - 05/09/2017    LDLCALC 117 (H) 03/11/2011     No results found for: LABA1C    IMAGING:  US GALLBLADDER RUQ    Result Date: 9/26/2021  EXAMINATION: RIGHT UPPER QUADRANT ULTRASOUND 9/26/2021 9:33 am COMPARISON: None. HISTORY: ORDERING SYSTEM PROVIDED HISTORY: elevated lft TECHNOLOGIST PROVIDED HISTORY: Reason for exam:->elevated lft What reading provider will be dictating this exam?->CRC FINDINGS: The gallbladder is normally distended, it has normal wall thickness, there is no stones or sludge. There was no positive ultrasound Augustin's sign at the time the present study in the region of the gallbladder. There is no fluid accumulation in the area of the gallbladder fossa. Biliary tree is not dilated CBD measures 5 mm. The pancreatic duct is visualized measuring up to 2 mm. It appears to be more towards the upper borderline range but not anatomically dilated, as it is below 3 mm diameter size. There is normal size and echogenicity for the liver. No focal lesions are seen. The pancreas has a relative homogeneous hypoechoic appearance and has normal size. The no peripancreatic fluid accumulation is identified the. The tail of the pancreas is obscured by bowel contents. The right kidney has normal size and cortical thickness, the right kidney measures 10.3 x 5 cm, cortical thickness: 14 mm. No renal calculus or cysts or mass seen. No obstruction of the right kidney. There is no ascites in the right upper quadrant. Normal gallbladder ultrasound. Unremarkable right upper quadrant ultrasound. XR CHEST PORTABLE    Result Date: 9/24/2021  EXAMINATION: ONE XRAY VIEW OF THE CHEST 9/24/2021 2:43 pm COMPARISON: None.  HISTORY: ORDERING SYSTEM PROVIDED HISTORY: COVID-19, cough, concern for pneumonia, clear lungs on exam. TECHNOLOGIST PROVIDED HISTORY: Reason for exam:->COVID-19, cough, concern for pneumonia, clear lungs on exam. FINDINGS: The cardiomediastinal silhouette is unremarkable. There are multifocal patchy pulmonary opacities, predominantly involving the right lung, which can be seen with pneumonia. No pneumothorax or pleural effusions. No acute osseous abnormality. Multifocal bilateral pulmonary airspace opacities, right greater than left, which can be seen with COVID-19 pneumonia. CTA PULMONARY W CONTRAST    Result Date: 9/24/2021  EXAMINATION: CTA OF THE CHEST 9/24/2021 3:57 pm TECHNIQUE: CTA of the chest was performed after the administration of intravenous contrast.  Multiplanar reformatted images are provided for review. MIP images are provided for review. Dose modulation, iterative reconstruction, and/or weight based adjustment of the mA/kV was utilized to reduce the radiation dose to as low as reasonably achievable. COMPARISON: None. HISTORY: ORDERING SYSTEM PROVIDED HISTORY: Suspected COVID-19 infection, tachycardia/dyspnea, concern for PE TECHNOLOGIST PROVIDED HISTORY: Reason for exam:->Suspected COVID-19 infection, tachycardia/dyspnea, concern for PE Decision Support Exception - unselect if not a suspected or confirmed emergency medical condition->Emergency Medical Condition (MA) FINDINGS: Pulmonary Arteries: Evaluation of the pulmonary arteries is limited by significant artifact secondary to respiratory motion. There is no evidence of large central pulmonary emboli. Smaller segmental and subsegmental pulmonary emboli cannot be reliably excluded based on this examination. Mediastinum: No evidence of mediastinal lymphadenopathy. The heart and pericardium demonstrate no acute abnormality. There is no acute abnormality of the thoracic aorta.  Lungs/pleura: Multifocal patchy and ground-glass airspace disease throughout the lungs bilaterally consistent with multifocal pneumonia and suspicious for COVID-19 pneumonia. There is no evidence of pleural effusions. Upper Abdomen: Limited images of the upper abdomen are unremarkable. Soft Tissues/Bones: No acute bone or soft tissue abnormality. Evaluation of the pulmonary arteries is limited by significant artifact secondary to respiratory motion. There is no evidence of large central pulmonary emboli. Smaller segmental and subsegmental pulmonary emboli cannot be reliably excluded based on this examination. Multifocal airspace disease consistent with multifocal pneumonia and suspicious for COVID-19 pneumonia. HOSPITAL COURSE:   Dale Sweeney improved dramatically throughout hospitalization. Initially admitted the evening of September 24 due to severe COVID-19 infection with resultant respiratory failure and hypoxia he is improved substantially. He was placed on high-dose IV dexamethasone and baricitinib responded well. He was rapidly weaned from oxygen. He tolerated substantial exertional count today without hypoxia or symptom decompensation. Despite his improvement we have provided the patient the opportunity to stay in the hospital for an additional 24 hours for supervised medical care but he declines and is requesting to be discharged home. We have discussed the pharmaceutical and nonpharmaceutical management of COVID-19 at length, the importance of follow-up and quarantine. 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 He voices understanding agreement. Liver enzymes did trend downward, right upper quadrant ultrasound was negative and this is likely a function of his COVID-19 infection. He appears much better overall and he is an otherwise healthy 77-year-old male patient, he is except for discharge home as discussed.      BRIEF PHYSICAL EXAMINATION AND LABORATORIES ON DAY OF DISCHARGE:  VITALS:  /72   Pulse 91   Temp 97.5 °F (36.4 °C) (Infrared)   Resp 18   Wt 220 lb (99.8 kg)   SpO2 97%   BMI 30.68 kg/m²     HEENT:  PERRLA. EOMI. Sclera clear. Buccal mucosa moist.    Neck:  Supple. Trachea midline. No thyromegaly. No JVD. No bruits. Heart:  Rhythm regular, rate controlled. S1-S2. No murmur. .    Lungs:  Symmetrical.  Very mildly diminished air exchange throughout. Clear to auscultation bilaterally. No wheezes. No rhonchi. No rales. Abdomen: Soft. Non-tender. Non-distended. Bowel sounds positive. No organomegaly or masses. No pain on palpation    Extremities:  Peripheral pulses present. No peripheral edema. No ulcers. Neurologic:  Alert x 3. No focal deficit. Cranial nerves grossly intact. Skin:  No petechia. No hemorrhage. No wounds. DISPOSITION:  The patient's condition is good. At this time the patient is without objective evidence of an acute process requiring continuing hospitalization or inpatient management. They are stable for discharge with outpatient follow-up. I have spoken with the patient and discussed the results of the current hospitalization, in addition to providing specific details for the plan of care and counseling regarding the diagnosis and prognosis. The plan has been discussed in detail and they are aware of the specific conditions for emergent return, as well as the importance of follow-up.   Their questions are answered at this time and they are agreeable with the plan for discharge to home    DISCHARGE MEDICATIONS:    Tucker Davidzac   Home Medication Instructions F:[id number]    Printed on:09/26/21 1212   Medication Information                      albuterol sulfate HFA (VENTOLIN HFA) 108 (90 Base) MCG/ACT inhaler  Inhale 2 puffs into the lungs 4 times daily as needed for Wheezing             ascorbic acid (VITAMIN C) 1000 MG tablet  Take 1 tablet by mouth daily             azithromycin (ZITHROMAX) 250 MG tablet  Take 1 tablet by mouth See Admin Instructions for 5 days 500mg on day 1 followed by 250mg on days 2 - 5             benzonatate (TESSALON) 100 MG capsule  Take 1 capsule by mouth 3 times daily as needed for Cough             budesonide-formoterol (SYMBICORT) 160-4.5 MCG/ACT AERO  Inhale 2 puffs into the lungs 2 times daily . Rinse and spit after each use. dexamethasone (DECADRON) 6 MG tablet  Take 1 tablet by mouth 2 times daily (with meals) for 5 days, THEN 1 tablet daily (with breakfast) for 5 days. melatonin 5 MG TBDP disintegrating tablet  Take 1 tablet by mouth nightly as needed (insomnia)             thiamine mononitrate (THIAMINE) 100 MG tablet  Take 1 tablet by mouth daily             vitamin B-6 (B-6) 50 MG tablet  Take 1 tablet by mouth daily             Vitamin D (CHOLECALCIFEROL) 50 MCG (2000 UT) TABS tablet  Take 1 tablet by mouth daily             vitamin E 400 UNIT capsule  Take 1 capsule by mouth daily             zinc sulfate (ZINCATE) 220 (50 Zn) MG capsule  Take 1 capsule by mouth daily                 FOLLOW UP/INSTRUCTIONS:  · This patient is instructed to follow-up with his primary care physician. · Patient is instructed to follow-up with the consults listed above as directed by them. · he is instructed to resume home medications and take new medications as indicated in the list above. · If the patient has a recurrence of symptoms, he is instructed to go to the ED. Preparing for this patient's discharge, including paperwork, orders, instructions, and meeting with patient did require > 40 minutes.     TRA Ritter CNP     9/26/2021  12:12 PM

## 2021-09-26 NOTE — PLAN OF CARE
Problem: Airway Clearance - Ineffective  Goal: Achieve or maintain patent airway  Outcome: Met This Shift     Problem: Gas Exchange - Impaired  Goal: Absence of hypoxia  Outcome: Met This Shift     Problem: Breathing Pattern - Ineffective  Goal: Ability to achieve and maintain a regular respiratory rate  Outcome: Met This Shift     Problem: Isolation Precautions - Risk of Spread of Infection  Goal: Prevent transmission of infection  Outcome: Met This Shift     Problem: Risk for Fluid Volume Deficit  Goal: Maintain normal heart rhythm  Outcome: Met This Shift     Problem: Loneliness or Risk for Loneliness  Goal: Demonstrate positive use of time alone when socialization is not possible  Outcome: Met This Shift     Problem: Patient Education: Go to Patient Education Activity  Goal: Patient/Family Education  Outcome: Met This Shift     Problem: Fatigue  Goal: Verbalize increase energy and improved vitality  Outcome: Ongoing

## 2021-09-27 LAB
CULTURE, RESPIRATORY: NORMAL
SMEAR, RESPIRATORY: NORMAL

## 2021-09-28 LAB
Lab: NORMAL
REPORT: NORMAL
THIS TEST SENT TO: NORMAL

## 2022-03-01 ENCOUNTER — HOSPITAL ENCOUNTER (EMERGENCY)
Age: 37
Discharge: HOME | End: 2022-03-01
Payer: COMMERCIAL

## 2022-03-01 VITALS
OXYGEN SATURATION: 97 % | TEMPERATURE: 98.7 F | BODY MASS INDEX: 29.4 KG/M2 | HEART RATE: 88 BPM | RESPIRATION RATE: 18 BRPM | SYSTOLIC BLOOD PRESSURE: 131 MMHG | DIASTOLIC BLOOD PRESSURE: 88 MMHG | WEIGHT: 210 LBS | HEIGHT: 71 IN

## 2022-03-01 DIAGNOSIS — A64 STI (SEXUALLY TRANSMITTED INFECTION): Primary | ICD-10-CM

## 2022-03-01 PROCEDURE — 96372 THER/PROPH/DIAG INJ SC/IM: CPT

## 2022-03-01 PROCEDURE — 99211 OFF/OP EST MAY X REQ PHY/QHP: CPT

## 2022-03-01 PROCEDURE — 6360000002 HC RX W HCPCS: Performed by: NURSE PRACTITIONER

## 2022-03-01 PROCEDURE — 2500000003 HC RX 250 WO HCPCS: Performed by: NURSE PRACTITIONER

## 2022-03-01 RX ORDER — AZITHROMYCIN 500 MG/1
1000 TABLET, FILM COATED ORAL ONCE
Qty: 2 TABLET | Refills: 0 | Status: SHIPPED | OUTPATIENT
Start: 2022-03-01 | End: 2022-03-01

## 2022-03-01 RX ADMIN — LIDOCAINE HYDROCHLORIDE 500 MG: 10 INJECTION, SOLUTION INFILTRATION; PERINEURAL at 17:50

## 2022-03-01 NOTE — ED PROVIDER NOTES
Department of Emergency [address] [facility]  Provider Note  Admit Date/Time: 3/1/2022  5:12 PM  Room:   NAME: Dionisio Merritt  : 1985  MRN: [id number]     Chief Complaint:  Rash (on his face )    History of Present Illness        Dionisio Merritt is a 39 y.o. male who has a past medical history of: No past medical history on file. presents to the urgent care center by private car for evaluation. He says that he was cheated on and is concerned he could have a STI. He wants it keep secret. He does not want tested, he just wants treated. He has no symptoms. ROS    Pertinent positives and negatives are stated within HPI, all other systems reviewed and are negative. Past Surgical History:   Procedure Laterality Date    NECK SURGERY     Social History:  reports that he has never smoked. He has never used smokeless tobacco. He reports current alcohol use. He reports that he does not use drugs. Family History: family history includes Other in his father. Allergies: Patient has no known allergies. Physical Exam   Oxygen Saturation Interpretation: Normal.   ED Triage Vitals [22 1714]   BP Temp Temp Source Pulse Resp SpO2 Height Weight   131/88 98.7 °F (37.1 °C) Infrared 88 18 97 % 5' 11\" (1.803 m) 210 lb (95.3 kg)       Physical Exam  · Constitutional/General: Alert and oriented x3, well appearing, non toxic in NAD  · HEENT:  NC/NT. · Neck: Supple, full ROM,   · Respiratory: No resp distress  · CV:  Regular rate. Regular rhythm. · Musculoskeletal: Moves all extremities x 4.   · Integument: skin warm and dry. No rashes. · Lymphatic: no lymphadenopathy noted  · Neurologic: GCS 15, no focal deficits,   · Psychiatric: Normal Affect    Lab / Imaging Results   (All laboratory and radiology results have been personally reviewed by myself)  Labs:  No results found for this visit on 22. Imaging:   All Radiology results interpreted by Radiologist unless otherwise noted. No orders to display       ED Course / Medical Decision Making     Medications   cefTRIAXone (ROCEPHIN) 500 mg in lidocaine 1 % 2 mL IM Injection (has no administration in time range)          Consult(s):   None    MDM: He said he has been exposed to an STD he said he really does not have a rash on his face he does did not want a total upfront while he was here he said he just wants treated he does not want any testing done he was given Rocephin IM I did give him a prescription for Zithromax he just wanted the 4 pills I wanted to do the doxycycline but he just wants a 1 day treatment he does not want to take it now he said he will take it in the morning. Advised him to follow-up with his family doctor if he has any further problems and safe practices. Assessment      1. STI (sexually transmitted infection)      Plan   Discharge to home and advised to contact Par[facility] [place], [address] [place] [phone]      As needed   Patient condition is good    New Medications     New Prescriptions    AZITHROMYCIN (ZITHROMAX) 500 MG TABLET    Take 2 tablets by mouth once for 1 dose     Electronically signed by TRA Malone CNP   DD: 3/1/22  **This report was transcribed using voice recognition software. Every effort was made to ensure accuracy; however, inadvertent computerized transcription errors may be present.   END OF ED PROVIDER NOTE     TRA Malone CNP  03/01/22 0273

## 2022-05-12 ENCOUNTER — HOSPITAL ENCOUNTER (EMERGENCY)
Age: 37
Discharge: HOME | End: 2022-05-12
Attending: EMERGENCY MEDICINE
Payer: COMMERCIAL

## 2022-05-12 VITALS
WEIGHT: 210 LBS | HEIGHT: 72 IN | SYSTOLIC BLOOD PRESSURE: 152 MMHG | TEMPERATURE: 98 F | HEART RATE: 91 BPM | OXYGEN SATURATION: 98 % | DIASTOLIC BLOOD PRESSURE: 104 MMHG | BODY MASS INDEX: 28.44 KG/M2 | RESPIRATION RATE: 20 BRPM

## 2022-05-12 DIAGNOSIS — M43.6 TORTICOLLIS: Primary | ICD-10-CM

## 2022-05-12 PROCEDURE — 6360000002 HC RX W HCPCS: Performed by: EMERGENCY MEDICINE

## 2022-05-12 PROCEDURE — 96372 THER/PROPH/DIAG INJ SC/IM: CPT

## 2022-05-12 PROCEDURE — 99284 EMERGENCY DEPT VISIT MOD MDM: CPT

## 2022-05-12 RX ORDER — CYCLOBENZAPRINE HCL 10 MG
10 TABLET ORAL 3 TIMES DAILY PRN
Qty: 21 TABLET | Refills: 0 | Status: SHIPPED | OUTPATIENT
Start: 2022-05-12 | End: 2022-05-22

## 2022-05-12 RX ORDER — KETOROLAC TROMETHAMINE 30 MG/ML
30 INJECTION, SOLUTION INTRAMUSCULAR; INTRAVENOUS ONCE
Status: COMPLETED | OUTPATIENT
Start: 2022-05-12 | End: 2022-05-12

## 2022-05-12 RX ORDER — IBUPROFEN 800 MG/1
800 TABLET ORAL EVERY 8 HOURS PRN
Qty: 21 TABLET | Refills: 0 | Status: SHIPPED | OUTPATIENT
Start: 2022-05-12 | End: 2022-05-19

## 2022-05-12 RX ORDER — ORPHENADRINE CITRATE 30 MG/ML
60 INJECTION INTRAMUSCULAR; INTRAVENOUS ONCE
Status: COMPLETED | OUTPATIENT
Start: 2022-05-12 | End: 2022-05-12

## 2022-05-12 RX ADMIN — KETOROLAC TROMETHAMINE 30 MG: 30 INJECTION, SOLUTION INTRAMUSCULAR; INTRAVENOUS at 06:23

## 2022-05-12 RX ADMIN — ORPHENADRINE CITRATE 60 MG: 60 INJECTION INTRAMUSCULAR; INTRAVENOUS at 06:24

## 2022-05-12 ASSESSMENT — ENCOUNTER SYMPTOMS
BACK PAIN: 0
SORE THROAT: 0
ABDOMINAL PAIN: 0
NAUSEA: 0
SINUS PRESSURE: 0
SHORTNESS OF BREATH: 0
VOMITING: 0
WHEEZING: 0
COUGH: 0
EYE DISCHARGE: 0
DIARRHEA: 0
EYE PAIN: 0
EYE REDNESS: 0

## 2022-05-12 ASSESSMENT — PAIN DESCRIPTION - FREQUENCY: FREQUENCY: CONTINUOUS

## 2022-05-12 ASSESSMENT — PAIN - FUNCTIONAL ASSESSMENT: PAIN_FUNCTIONAL_ASSESSMENT: 0-10

## 2022-05-12 ASSESSMENT — PAIN SCALES - GENERAL
PAINLEVEL_OUTOF10: 10

## 2022-05-12 ASSESSMENT — PAIN DESCRIPTION - ORIENTATION: ORIENTATION: LEFT

## 2022-05-12 ASSESSMENT — PAIN DESCRIPTION - LOCATION: LOCATION: NECK;SHOULDER

## 2022-05-12 ASSESSMENT — PAIN DESCRIPTION - PAIN TYPE: TYPE: ACUTE PAIN

## 2022-05-12 ASSESSMENT — PAIN DESCRIPTION - DESCRIPTORS: DESCRIPTORS: ACHING

## 2023-01-25 ENCOUNTER — HOSPITAL ENCOUNTER (EMERGENCY)
Age: 38
Discharge: HOME | End: 2023-01-25
Attending: EMERGENCY MEDICINE
Payer: COMMERCIAL

## 2023-01-25 ENCOUNTER — APPOINTMENT (OUTPATIENT)
Dept: GENERAL RADIOLOGY | Age: 38
End: 2023-01-25
Payer: COMMERCIAL

## 2023-01-25 VITALS
OXYGEN SATURATION: 97 % | DIASTOLIC BLOOD PRESSURE: 99 MMHG | RESPIRATION RATE: 16 BRPM | TEMPERATURE: 99.1 F | SYSTOLIC BLOOD PRESSURE: 147 MMHG | HEART RATE: 93 BPM

## 2023-01-25 DIAGNOSIS — S63.502A SPRAIN OF LEFT WRIST, INITIAL ENCOUNTER: Primary | ICD-10-CM

## 2023-01-25 PROCEDURE — 73110 X-RAY EXAM OF WRIST: CPT

## 2023-01-25 PROCEDURE — 99283 EMERGENCY DEPT VISIT LOW MDM: CPT

## 2023-01-25 PROCEDURE — 73090 X-RAY EXAM OF FOREARM: CPT

## 2023-01-25 PROCEDURE — 29125 APPL SHORT ARM SPLINT STATIC: CPT

## 2023-01-25 PROCEDURE — 6370000000 HC RX 637 (ALT 250 FOR IP): Performed by: STUDENT IN AN ORGANIZED HEALTH CARE EDUCATION/TRAINING PROGRAM

## 2023-01-25 RX ORDER — IBUPROFEN 400 MG/1
400 TABLET ORAL ONCE
Status: COMPLETED | OUTPATIENT
Start: 2023-01-25 | End: 2023-01-25

## 2023-01-25 RX ADMIN — IBUPROFEN 400 MG: 400 TABLET, FILM COATED ORAL at 03:01

## 2023-01-25 ASSESSMENT — PAIN DESCRIPTION - DESCRIPTORS: DESCRIPTORS: NUMBNESS

## 2023-01-25 ASSESSMENT — PAIN DESCRIPTION - LOCATION
LOCATION: WRIST
LOCATION: WRIST

## 2023-01-25 ASSESSMENT — PAIN - FUNCTIONAL ASSESSMENT: PAIN_FUNCTIONAL_ASSESSMENT: 0-10

## 2023-01-25 ASSESSMENT — LIFESTYLE VARIABLES: HOW OFTEN DO YOU HAVE A DRINK CONTAINING ALCOHOL: MONTHLY OR LESS

## 2023-01-25 ASSESSMENT — PAIN SCALES - GENERAL
PAINLEVEL_OUTOF10: 4
PAINLEVEL_OUTOF10: 3

## 2023-01-25 ASSESSMENT — PAIN DESCRIPTION - ORIENTATION: ORIENTATION: LEFT

## 2023-01-25 NOTE — Clinical Note
Khadra Perez was seen and treated in our emergency department on 1/25/2023. He may return to work on 01/26/2023. If you have any questions or concerns, please don't hesitate to call.       Prakash Dixon, DO

## 2023-01-25 NOTE — ED PROVIDER NOTES
[address]        Pt Name: Judi Scanlon  MRN: [id number]  [place] 1985  Date of evaluation: 1/25/2023  Provider: Frank Cavanaugh MD  PCP: Lenin Gill DO  Note Started: 2:46 AM EST 1/25/23    CHIEF COMPLAINT       Chief Complaint   Patient presents with    Wrist Injury     Delia Rodrigoa at work walking through an arched walkway; landed on left wrist; c/o numbness to left wrist and hand; denies pain anywhere else        HISTORY OF PRESENT ILLNESS: 1 or more Elements        Limitations to history : None    Judi Scanlon is a 44 y.o. male who presents to the emergency part for wrist injury. Was walking at work today and slipped and fell landing on his left wrist on the outstretched hand, is complaining of some pain over his to the emergency department complains of pain over the posterior aspect of his radius, along with some tingling over that area, no complaints of weakness, no noted deformity, did not hit elsewhere    Nursing Notes were all reviewed and agreed with or any disagreements were addressed in the HPI. REVIEW OF EXTERNAL NOTE :       On chart review patient was admitted to hospital on 9/24/2021 for acute hypoxemic respiratory failure from COVID-19    REVIEW OF SYSTEMS :      Positives and Pertinent negatives as per HPI. SURGICAL HISTORY     Past Surgical History:   Procedure Laterality Date    NECK SURGERY         CURRENTMEDICATIONS       Discharge Medication List as of 1/25/2023  4:07 AM        CONTINUE these medications which have NOT CHANGED    Details   ibuprofen (IBU) 800 MG tablet Take 1 tablet by mouth every 8 hours as needed for Pain, Disp-21 tablet, R-0Print      budesonide-formoterol (SYMBICORT) 160-4.5 MCG/ACT AERO Inhale 2 puffs into the lungs 2 times daily . Rinse and spit after each use., Disp-10.2 g, R-1May substitute covered alternative. Normal      albuterol sulfate HFA (VENTOLIN HFA) 108 (90 Base) MCG/ACT inhaler Inhale 2 puffs into the lungs 4 times daily as needed for Wheezing, Disp-18 g, R-1Normal      melatonin 5 MG TBDP disintegrating tablet Take 1 tablet by mouth nightly as needed (insomnia), R-0OTC      zinc sulfate (ZINCATE) 220 (50 Zn) MG capsule Take 1 capsule by mouth daily, Disp-30 capsule, R-0OTC      ascorbic acid (VITAMIN C) 1000 MG tablet Take 1 tablet by mouth daily, Disp-30 tablet, R-0OTC      thiamine mononitrate (THIAMINE) 100 MG tablet Take 1 tablet by mouth daily, Disp-30 tablet, R-0OTC      vitamin B-6 (B-6) 50 MG tablet Take 1 tablet by mouth daily, Disp-30 tablet, R-0OTC      Vitamin D (CHOLECALCIFEROL) 50 MCG (2000 UT) TABS tablet Take 1 tablet by mouth daily, Disp-30 tablet, R-0Labeling may look different. 25 mcg=1000 Units. Please double check dosages. OTC      vitamin E 400 UNIT capsule Take 1 capsule by mouth daily, Disp-30 capsule, R-0OTC             ALLERGIES     Patient has no known allergies. FAMILYHISTORY       Family History   Problem Relation Age of Onset    Other Father         kidney issues        SOCIAL HISTORY       Social History     Tobacco Use    Smoking status: Never    Smokeless tobacco: Never   Vaping Use    Vaping Use: Never used   Substance Use Topics    Alcohol use: Yes     Comment: occ    Drug use: No       SCREENINGS        Ysabel Coma Scale  Eye Opening: Spontaneous  Best Verbal Response: Oriented  Best Motor Response: Obeys commands  Fletcher Coma Scale Score: 15                CIWA Assessment  BP: (!) 147/99  Heart Rate: 93           PHYSICAL EXAM  1 or more Elements     ED Triage Vitals   BP Temp Temp Source Heart Rate Resp SpO2 Height Weight   01/25/23 0222 01/25/23 0211 01/25/23 0222 01/25/23 0211 01/25/23 0222 01/25/23 0211 -- --   (!) 147/99 97.2 °F (36.2 °C) Oral 98 16 99 %           Physical Exam  Vitals and nursing note reviewed. Constitutional:       Appearance: He is well-developed. HENT:      Head: Normocephalic and atraumatic. Eyes:      Conjunctiva/sclera: Conjunctivae normal.   Cardiovascular:      Rate and Rhythm: Normal rate and regular rhythm. Heart sounds: Normal heart sounds. No murmur heard. Pulmonary:      Effort: Pulmonary effort is normal. No respiratory distress. Breath sounds: Normal breath sounds. No wheezing or rales. Abdominal:      General: Bowel sounds are normal.      Palpations: Abdomen is soft. Tenderness: There is no abdominal tenderness. There is no guarding or rebound. Musculoskeletal:         General: Tenderness (Minimal left radial tenderness) present. No swelling or deformity. Cervical back: Normal range of motion and neck supple. Skin:     General: Skin is warm and dry. Neurological:      Mental Status: He is alert and oriented to person, place, and time. Cranial Nerves: No cranial nerve deficit. Sensory: No sensory deficit. Motor: No weakness. DIAGNOSTIC RESULTS   LABS:    Labs Reviewed - No data to display    As interpreted by me, the above displayed labs are abnormal. All other labs obtained during this visit were within normal range or not returned as of this dictation. RADIOLOGY:   Non-plain film images such as CT, Ultrasound and MRI are read by the radiologist. Plain radiographic images are visualized and preliminarily interpreted by the ED Provider with the findings documented in the ED Course. Interpretation per the Radiologist below, if available at the time of this note:    XR WRIST LEFT (MIN 3 VIEWS)   Final Result   No acute disease. RECOMMENDATION:   Careful clinical correlation and follow up recommended. If the patient is   tender over the scaphoid, consider splinting and reimaging. XR RADIUS ULNA LEFT (2 VIEWS)   Final Result   No acute disease. RECOMMENDATION:   Careful clinical correlation and follow up recommended. No results found. No results found.     PROCEDURES   Unless otherwise noted below, none       CRITICAL CARE TIME (.cct)   None    PAST MEDICAL HISTORY/Chronic Conditions Affecting Care      has no past medical history on file. EMERGENCY DEPARTMENT COURSE    Vitals:    Vitals:    01/25/23 0211 01/25/23 0222   BP:  (!) 147/99   Pulse: 98 93   Resp:  16   Temp: 97.2 °F (36.2 °C) 99.1 °F (37.3 °C)   TempSrc:  Oral   SpO2: 99% 97%       Patient was given the following medications:  Medications   ibuprofen (ADVIL;MOTRIN) tablet 400 mg (400 mg Oral Given 1/25/23 0301)         Is this patient to be included in the SEP-1 Core Measure due to severe sepsis or septic shock? No Exclusion criteria - the patient is NOT to be included for SEP-1 Core Measure due to: Infection is not suspected          Medical Decision Making/Differential Diagnosis:    CC/HPI Summary, Social Determinants of health, Records Reviewed, DDx, testing done/not done, ED Course, Reassessment, disposition considerations/shared decision making with patient, consults, disposition:        ED Course as of 01/25/23 0608   Wed Jan 25, 2023   0326 On my interpretation of patient's wrist x-ray no noted deformity, fracture, dislocation. [JG]   [id number] X-ray of the wrist read as negative, they recommended splinting if patient had tenderness of the scaphoid which he does not on exam. [JG]   0352 X-ray negative radius and ulna [JG]   [age]-year-old male presenting to the emerged from for wrist injury, fell at work while walking 3 work way, [employer] filled out for this patient.   Neurovascular intact on exam, and good range of motion of wrist, minimal tenderness over the posterior ulna, suspected possible dislocation, but did not appear to be deformed on exam, includes considered neurovascular injury, but pulses were normal, and neurologically intact on exam, considered possible fracture, none present on x-ray, no visible deformity on exam.  History and physical exam seem consistent with wrist sprain, he is placed in a thumb spica splint instructed follow-up with his PCP for reimaging in 1 week, he did not have significant pain over the snuffbox, or scaphoid, or pain with axial loading to suggest a scaphoid injury however.,  Patient was discharged, return precautions given, instructed follow-up with her primary care physician [JG]      ED Course User Index  [JG] Mickie Sánchez MD       Medical Decision Making  Amount and/or Complexity of Data Reviewed  Radiology: ordered and independent interpretation performed. Decision-making details documented in ED Course. Risk  Prescription drug management. CONSULTS: (Who and What was discussed)  None        I am the Primary Clinician of Record. FINAL IMPRESSION      1.  Sprain of left wrist, initial encounter          DISPOSITION/PLAN     DISPOSITION Decision To Discharge 01/25/2023 04:15:31 AM      PATIENT REFERRED TO:  Chavez Minaya DO  [address] [place] [address] [phone]    Call in 1 week  For follow up appointment for your wrist    DISCHARGE MEDICATIONS:  Discharge Medication List as of 1/25/2023  4:07 AM          DISCONTINUED MEDICATIONS:  Discharge Medication List as of 1/25/2023  4:07 AM                 (Please note that portions of this note were completed with a voice recognition program.  Efforts were made to edit the dictations but occasionally words are mis-transcribed.)    Ronald Cantor MD (electronically signed)           Mickie Sánchez MD  Resident  01/25/23 1326

## 2023-11-01 ENCOUNTER — HOSPITAL ENCOUNTER (EMERGENCY)
Age: 38
Discharge: HOME | End: 2023-11-01
Payer: COMMERCIAL

## 2023-11-01 VITALS
OXYGEN SATURATION: 100 % | RESPIRATION RATE: 18 BRPM | BODY MASS INDEX: 27.12 KG/M2 | SYSTOLIC BLOOD PRESSURE: 143 MMHG | TEMPERATURE: 97.7 F | HEART RATE: 101 BPM | WEIGHT: 200 LBS | DIASTOLIC BLOOD PRESSURE: 103 MMHG

## 2023-11-01 DIAGNOSIS — J01.00 ACUTE MAXILLARY SINUSITIS, RECURRENCE NOT SPECIFIED: Primary | ICD-10-CM

## 2023-11-01 PROCEDURE — 99211 OFF/OP EST MAY X REQ PHY/QHP: CPT

## 2023-11-01 RX ORDER — DIPHENHYDRAMINE HCL 25 MG
25 TABLET ORAL EVERY 6 HOURS PRN
Qty: 30 TABLET | Refills: 0 | Status: SHIPPED | OUTPATIENT
Start: 2023-11-01 | End: 2023-12-01

## 2023-11-01 RX ORDER — BROMPHENIRAMINE MALEATE, PSEUDOEPHEDRINE HYDROCHLORIDE, AND DEXTROMETHORPHAN HYDROBROMIDE 2; 30; 10 MG/5ML; MG/5ML; MG/5ML
5 SYRUP ORAL 4 TIMES DAILY PRN
Qty: 120 ML | Refills: 0 | Status: SHIPPED | OUTPATIENT
Start: 2023-11-01 | End: 2023-11-06

## 2023-11-01 RX ORDER — METHYLPREDNISOLONE 4 MG/1
TABLET ORAL
Qty: 1 KIT | Refills: 0 | Status: SHIPPED | OUTPATIENT
Start: 2023-11-01 | End: 2023-11-07

## 2023-11-01 RX ORDER — AMOXICILLIN 500 MG/1
500 CAPSULE ORAL 3 TIMES DAILY
Qty: 21 CAPSULE | Refills: 0 | Status: SHIPPED | OUTPATIENT
Start: 2023-11-01 | End: 2023-11-08

## 2023-11-01 ASSESSMENT — PAIN - FUNCTIONAL ASSESSMENT: PAIN_FUNCTIONAL_ASSESSMENT: NONE - DENIES PAIN

## 2023-12-06 NOTE — ED NOTES
Patient stopped by urgent care today stating he did not get all the treatment he was intending to get when he was here on 7/15/19. Says he meant also to get treated for possible Trich infection. Rx: Flagyl 500mg bid x 7 days. Instructions given verbally to patient. And patient told to continue written instructions. Return if worsen.           Narciso Javed PA-C  07/22/19 4632 Alert-The patient is alert, awake and responds to voice. The patient is oriented to time, place, and person. The triage nurse is able to obtain subjective information.

## 2024-05-16 ENCOUNTER — HOSPITAL ENCOUNTER (EMERGENCY)
Age: 39
Discharge: HOME | End: 2024-05-16
Payer: COMMERCIAL

## 2024-05-16 VITALS
HEART RATE: 117 BPM | DIASTOLIC BLOOD PRESSURE: 104 MMHG | BODY MASS INDEX: 32.55 KG/M2 | WEIGHT: 240 LBS | SYSTOLIC BLOOD PRESSURE: 142 MMHG | OXYGEN SATURATION: 99 % | RESPIRATION RATE: 16 BRPM | TEMPERATURE: 98.6 F

## 2024-05-16 DIAGNOSIS — R21 RASH: Primary | ICD-10-CM

## 2024-05-16 PROCEDURE — 99211 OFF/OP EST MAY X REQ PHY/QHP: CPT

## 2024-05-16 RX ORDER — DOXYCYCLINE HYCLATE 100 MG
100 TABLET ORAL 2 TIMES DAILY
Qty: 14 TABLET | Refills: 0 | Status: SHIPPED | OUTPATIENT
Start: 2024-05-16 | End: 2024-05-23

## 2024-05-16 RX ORDER — METRONIDAZOLE 500 MG/1
2000 TABLET ORAL ONCE
Qty: 4 TABLET | Refills: 0 | Status: SHIPPED | OUTPATIENT
Start: 2024-05-16 | End: 2024-05-16

## 2024-05-16 ASSESSMENT — PAIN SCALES - GENERAL: PAINLEVEL_OUTOF10: 0

## 2024-05-16 ASSESSMENT — PAIN - FUNCTIONAL ASSESSMENT: PAIN_FUNCTIONAL_ASSESSMENT: 0-10

## 2024-05-16 NOTE — ED PROVIDER NOTES
Normal     Physical Exam  Vitals and nursing note reviewed.   Constitutional:       Appearance: He is well-developed.   HENT:      Head: Normocephalic and atraumatic.      Jaw: No trismus.      Right Ear: Hearing and external ear normal.      Left Ear: Hearing and external ear normal.      Nose: Nose normal.      Right Sinus: No maxillary sinus tenderness or frontal sinus tenderness.      Left Sinus: No maxillary sinus tenderness or frontal sinus tenderness.      Mouth/Throat:      Pharynx: Uvula midline. No uvula swelling.   Eyes:      General: Lids are normal.      Conjunctiva/sclera: Conjunctivae normal.      Pupils: Pupils are equal, round, and reactive to light.   Cardiovascular:      Rate and Rhythm: Normal rate and regular rhythm.      Heart sounds: Normal heart sounds.   Pulmonary:      Effort: Pulmonary effort is normal.      Breath sounds: Normal breath sounds.   Abdominal:      General: Bowel sounds are normal.      Palpations: Abdomen is soft. Abdomen is not rigid.   Genitourinary:     Comments: Patient defers  exam at this time.  Musculoskeletal:      Cervical back: Normal range of motion and neck supple.   Skin:     General: Skin is warm and dry.      Findings: Rash present. No abrasion. Rash is pustular.      Comments: Several small pustules noted to the facial region.  Patient does have a beard.  No drainage.  No redness or swelling of the skin   Neurological:      General: No focal deficit present.      Mental Status: He is alert and oriented to person, place, and time.      GCS: GCS eye subscore is 4. GCS verbal subscore is 5. GCS motor subscore is 6.      Cranial Nerves: Cranial nerves 2-12 are intact.      Sensory: Sensation is intact.      Motor: Motor function is intact.      Coordination: Coordination is intact.      Gait: Gait is intact.         -------------------------------------------------- RESULTS -------------------------------------------------  No results found for this visit on

## 2024-10-10 ENCOUNTER — HOSPITAL ENCOUNTER (EMERGENCY)
Age: 39
Discharge: HOME | End: 2024-10-10
Payer: COMMERCIAL

## 2024-10-10 VITALS
SYSTOLIC BLOOD PRESSURE: 141 MMHG | HEART RATE: 84 BPM | HEIGHT: 71 IN | TEMPERATURE: 98.6 F | BODY MASS INDEX: 29.4 KG/M2 | WEIGHT: 210 LBS | OXYGEN SATURATION: 99 % | RESPIRATION RATE: 18 BRPM | DIASTOLIC BLOOD PRESSURE: 105 MMHG

## 2024-10-10 DIAGNOSIS — L28.2 PRURITIC RASH: Primary | ICD-10-CM

## 2024-10-10 PROCEDURE — 99211 OFF/OP EST MAY X REQ PHY/QHP: CPT

## 2024-10-10 RX ORDER — CEPHALEXIN 500 MG/1
500 CAPSULE ORAL 3 TIMES DAILY
Qty: 21 CAPSULE | Refills: 0 | Status: SHIPPED | OUTPATIENT
Start: 2024-10-10 | End: 2024-10-17

## 2024-10-10 RX ORDER — HYDROCORTISONE 2.5 %
CREAM (GRAM) TOPICAL
Qty: 28 G | Refills: 0 | Status: SHIPPED | OUTPATIENT
Start: 2024-10-10

## 2024-10-10 ASSESSMENT — PAIN - FUNCTIONAL ASSESSMENT: PAIN_FUNCTIONAL_ASSESSMENT: 0-10

## 2024-10-10 ASSESSMENT — PAIN SCALES - GENERAL: PAINLEVEL_OUTOF10: 0

## 2024-10-10 NOTE — DISCHARGE INSTRUCTIONS
Tylenol 650 to 1000 mg every 8 hours as needed for pain/fever.  Ibuprofen 600 mg every 8 hours as needed for inflammation/pain/fever. Take with food and water.  Take benadryl for itching/rash

## 2024-10-10 NOTE — ED PROVIDER NOTES
[facility] URGENT CARE  EMERGENCY DEPARTMENT ENCOUNTER        NAME: Mneg Hinkle  :  1985  MRN:  [id number]  Date of evaluation: 10/10/2024  Provider: Vishal Mendoza PA-C  PCP: Tani Womack DO  Note Started : 2:08 PM EDT 10/10/24    Chief Complaint: Rash (On right arm )      This is a 38-year-old male that presents to urgent care complaining of a red swollen itchy rash to his right forearm today.  He thinks maybe he was bit by an insect he is not sure but he has no difficulty breathing or swallowing.  No fevers or chills.  No bony pain.  No numbness or tingling.  On first contact patient he appears to be in no acute distress.        Review of Systems  Pertinent positives and negatives are stated within HPI, all other systems reviewed and are negative.     Allergies: Patient has no known allergies.     --------------------------------------------- PAST HISTORY ---------------------------------------------  Past Medical History:  has no past medical history on file.    Past Surgical History:  has a past surgical history that includes Neck surgery.    Social History:  reports that he has never smoked. He has never used smokeless tobacco. He reports current alcohol use. He reports that he does not use drugs.    Family History: family history includes Other in his father.     The patient’s home medications have been reviewed.    The nursing notes within the ED encounter have been reviewed.     ------------------------------------------------SCREENINGS----------------------------------------------                        CIWA Assessment  BP: (!) 141/105  Pulse: 84           ---------------------------------------------PHYSICAL EXAM --------------------------------------------    Vitals:    10/10/24 1407 10/10/24 1408   BP: (!) 141/105    Pulse: 84    Resp: 18    Temp: 98.6 °F (37 °C)    SpO2: 99%    Weight:  95.3 kg (210 lb)   Height:  1.803 m (5' 11\")     Oxygen Saturation Interpretation:

## 2024-10-10 NOTE — DISCHARGE INSTR - COC
{Prognosis:0002937741}    Condition at Discharge: { Patient Condition:181202276}    Rehab Potential (if transferring to Rehab): {Prognosis:1324030905}    Recommended Labs or Other Treatments After Discharge: ***    Physician Certification: I certify the above information and transfer of Meng Hinkle  is necessary for the continuing treatment of the diagnosis listed and that he requires {Admit to Appropriate Level of Care:95996} for {GREATER/LESS:163239130} 30 days.     Update Admission H&P: {CHP DME Changes in HandP:404658320}    PHYSICIAN SIGNATURE:  {Esignature:091938824}

## 2025-01-28 ENCOUNTER — HOSPITAL ENCOUNTER (EMERGENCY)
Age: 40
Discharge: HOME | End: 2025-01-28
Payer: COMMERCIAL

## 2025-01-28 VITALS
SYSTOLIC BLOOD PRESSURE: 156 MMHG | DIASTOLIC BLOOD PRESSURE: 110 MMHG | RESPIRATION RATE: 16 BRPM | TEMPERATURE: 98.4 F | BODY MASS INDEX: 31.38 KG/M2 | OXYGEN SATURATION: 98 % | WEIGHT: 225 LBS | HEART RATE: 86 BPM

## 2025-01-28 DIAGNOSIS — Z20.2 EXPOSURE TO STD: ICD-10-CM

## 2025-01-28 DIAGNOSIS — R21 RASH AND OTHER NONSPECIFIC SKIN ERUPTION: Primary | ICD-10-CM

## 2025-01-28 PROCEDURE — 96372 THER/PROPH/DIAG INJ SC/IM: CPT

## 2025-01-28 PROCEDURE — 6360000002 HC RX W HCPCS: Performed by: PHYSICIAN ASSISTANT

## 2025-01-28 PROCEDURE — 99211 OFF/OP EST MAY X REQ PHY/QHP: CPT

## 2025-01-28 PROCEDURE — 6370000000 HC RX 637 (ALT 250 FOR IP): Performed by: PHYSICIAN ASSISTANT

## 2025-01-28 RX ORDER — AZITHROMYCIN 250 MG/1
1000 TABLET, FILM COATED ORAL ONCE
Status: COMPLETED | OUTPATIENT
Start: 2025-01-28 | End: 2025-01-28

## 2025-01-28 RX ADMIN — AZITHROMYCIN DIHYDRATE 1000 MG: 250 TABLET ORAL at 15:48

## 2025-01-28 RX ADMIN — LIDOCAINE HYDROCHLORIDE 500 MG: 10 INJECTION, SOLUTION EPIDURAL; INFILTRATION; INTRACAUDAL; PERINEURAL at 15:49

## 2025-01-28 ASSESSMENT — PAIN SCALES - GENERAL: PAINLEVEL_OUTOF10: 0

## 2025-01-28 ASSESSMENT — PAIN - FUNCTIONAL ASSESSMENT: PAIN_FUNCTIONAL_ASSESSMENT: 0-10

## 2025-01-28 NOTE — ED PROVIDER NOTES
Independent OTF Visit.    HPI:  1/28/25,   Time: 3:41 PM SB Hinkle is a 39 y.o. male presenting to the ED for rash and exposed to STI.  Patient reports he has a rash on his face around his beard.  States that he usually uses proactive for his acne however he ran out and went to the drugstore to buy over-the-counter stuff and flared up.  He discontinued that and has improvement in symptoms.  He also would like treated for STIs.  He does not want tested.  States he is asymptomatic but promised his girlfriend he would get treated for everything.  Denies fever, chills, body aches, facial swelling, dysphagia, abdominal pain, nausea, vomiting, diarrhea, dysuria, penile discharge or scrotal swelling.    ROS:   Pertinent positives and negatives are stated within HPI, all other systems reviewed and are negative.  --------------------------------------------- PAST HISTORY ---------------------------------------------  Past Medical History:  has no past medical history on file.    Past Surgical History:  has a past surgical history that includes Neck surgery.    Social History:  reports that he has never smoked. He has never used smokeless tobacco. He reports current alcohol use. He reports that he does not use drugs.    Family History: family history includes Other in his father.     The patient’s home medications have been reviewed.    Allergies: Patient has no known allergies.    -------------------------------------------------- RESULTS -------------------------------------------------  All laboratory and radiology results have been personally reviewed by myself   LABS:  No results found for this visit on 01/28/25.    RADIOLOGY:  Interpreted by Radiologist.  No orders to display       ------------------------- NURSING NOTES AND VITALS REVIEWED ---------------------------   The nursing notes within the ED encounter and vital signs as below have been reviewed.   BP (!) 156/110   Pulse 86   Temp 98.4 °F

## 2025-06-02 ENCOUNTER — HOSPITAL ENCOUNTER (EMERGENCY)
Age: 40
Discharge: HOME | End: 2025-06-03
Attending: EMERGENCY MEDICINE
Payer: COMMERCIAL

## 2025-06-02 ENCOUNTER — APPOINTMENT (OUTPATIENT)
Dept: ULTRASOUND IMAGING | Age: 40
End: 2025-06-02
Payer: COMMERCIAL

## 2025-06-02 VITALS
DIASTOLIC BLOOD PRESSURE: 106 MMHG | HEART RATE: 102 BPM | RESPIRATION RATE: 20 BRPM | OXYGEN SATURATION: 100 % | TEMPERATURE: 97.6 F | SYSTOLIC BLOOD PRESSURE: 154 MMHG

## 2025-06-02 DIAGNOSIS — N45.1 EPIDIDYMITIS, LEFT: ICD-10-CM

## 2025-06-02 DIAGNOSIS — R52 PAIN: Primary | ICD-10-CM

## 2025-06-02 DIAGNOSIS — N50.1 MALE HEMATOCELE: ICD-10-CM

## 2025-06-02 LAB
ANION GAP SERPL CALCULATED.3IONS-SCNC: 14 MMOL/L (ref 7–16)
BASOPHILS # BLD: 0.02 K/UL (ref 0–0.2)
BASOPHILS NFR BLD: 0 % (ref 0–2)
BILIRUB UR QL STRIP: NEGATIVE
BUN SERPL-MCNC: 9 MG/DL (ref 6–20)
CALCIUM SERPL-MCNC: 10.1 MG/DL (ref 8.6–10.2)
CHLORIDE SERPL-SCNC: 100 MMOL/L (ref 98–107)
CLARITY UR: CLEAR
CO2 SERPL-SCNC: 25 MMOL/L (ref 22–29)
COLOR UR: YELLOW
CREAT SERPL-MCNC: 1.1 MG/DL (ref 0.7–1.2)
EOSINOPHIL # BLD: 0.04 K/UL (ref 0.05–0.5)
EOSINOPHILS RELATIVE PERCENT: 0 % (ref 0–6)
EPI CELLS #/AREA URNS HPF: ABNORMAL /HPF
ERYTHROCYTE [DISTWIDTH] IN BLOOD BY AUTOMATED COUNT: 13.3 % (ref 11.5–15)
GFR, ESTIMATED: 83 ML/MIN/1.73M2
GLUCOSE SERPL-MCNC: 106 MG/DL (ref 74–99)
GLUCOSE UR STRIP-MCNC: NEGATIVE MG/DL
HCT VFR BLD AUTO: 37.8 % (ref 37–54)
HGB BLD-MCNC: 12.4 G/DL (ref 12.5–16.5)
HGB UR QL STRIP.AUTO: ABNORMAL
IMM GRANULOCYTES # BLD AUTO: 0.05 K/UL (ref 0–0.58)
IMM GRANULOCYTES NFR BLD: 1 % (ref 0–5)
KETONES UR STRIP-MCNC: NEGATIVE MG/DL
LACTATE BLDV-SCNC: 1.2 MMOL/L (ref 0.5–2.2)
LEUKOCYTE ESTERASE UR QL STRIP: ABNORMAL
LYMPHOCYTES NFR BLD: 1.92 K/UL (ref 1.5–4)
LYMPHOCYTES RELATIVE PERCENT: 20 % (ref 20–42)
MCH RBC QN AUTO: 31.9 PG (ref 26–35)
MCHC RBC AUTO-ENTMCNC: 32.8 G/DL (ref 32–34.5)
MCV RBC AUTO: 97.2 FL (ref 80–99.9)
MONOCYTES NFR BLD: 0.62 K/UL (ref 0.1–0.95)
MONOCYTES NFR BLD: 6 % (ref 2–12)
MUCOUS THREADS URNS QL MICRO: PRESENT
NEUTROPHILS NFR BLD: 73 % (ref 43–80)
NEUTS SEG NFR BLD: 7.12 K/UL (ref 1.8–7.3)
NITRITE UR QL STRIP: NEGATIVE
PH UR STRIP: 6 [PH] (ref 5–8)
PLATELET # BLD AUTO: 363 K/UL (ref 130–450)
PMV BLD AUTO: 10.4 FL (ref 7–12)
POTASSIUM SERPL-SCNC: 3.8 MMOL/L (ref 3.5–5)
PROT UR STRIP-MCNC: NEGATIVE MG/DL
RBC # BLD AUTO: 3.89 M/UL (ref 3.8–5.8)
RBC #/AREA URNS HPF: ABNORMAL /HPF
SODIUM SERPL-SCNC: 139 MMOL/L (ref 132–146)
SP GR UR STRIP: >1.03 (ref 1–1.03)
UROBILINOGEN UR STRIP-ACNC: 0.2 EU/DL (ref 0–1)
WBC #/AREA URNS HPF: ABNORMAL /HPF
WBC OTHER # BLD: 9.8 K/UL (ref 4.5–11.5)

## 2025-06-02 PROCEDURE — 93976 VASCULAR STUDY: CPT

## 2025-06-02 PROCEDURE — 96361 HYDRATE IV INFUSION ADD-ON: CPT

## 2025-06-02 PROCEDURE — 83605 ASSAY OF LACTIC ACID: CPT

## 2025-06-02 PROCEDURE — 2580000003 HC RX 258: Performed by: EMERGENCY MEDICINE

## 2025-06-02 PROCEDURE — 99285 EMERGENCY DEPT VISIT HI MDM: CPT

## 2025-06-02 PROCEDURE — 85025 COMPLETE CBC W/AUTO DIFF WBC: CPT

## 2025-06-02 PROCEDURE — 6360000002 HC RX W HCPCS: Performed by: EMERGENCY MEDICINE

## 2025-06-02 PROCEDURE — 80048 BASIC METABOLIC PNL TOTAL CA: CPT

## 2025-06-02 PROCEDURE — 81001 URINALYSIS AUTO W/SCOPE: CPT

## 2025-06-02 PROCEDURE — 96374 THER/PROPH/DIAG INJ IV PUSH: CPT

## 2025-06-02 PROCEDURE — 76870 US EXAM SCROTUM: CPT

## 2025-06-02 RX ORDER — KETOROLAC TROMETHAMINE 30 MG/ML
30 INJECTION, SOLUTION INTRAMUSCULAR; INTRAVENOUS ONCE
Status: COMPLETED | OUTPATIENT
Start: 2025-06-02 | End: 2025-06-02

## 2025-06-02 RX ORDER — 0.9 % SODIUM CHLORIDE 0.9 %
1000 INTRAVENOUS SOLUTION INTRAVENOUS ONCE
Status: COMPLETED | OUTPATIENT
Start: 2025-06-02 | End: 2025-06-03

## 2025-06-02 RX ADMIN — KETOROLAC TROMETHAMINE 30 MG: 30 INJECTION, SOLUTION INTRAMUSCULAR at 21:50

## 2025-06-02 RX ADMIN — SODIUM CHLORIDE 1000 ML: 0.9 INJECTION, SOLUTION INTRAVENOUS at 21:49

## 2025-06-02 ASSESSMENT — ENCOUNTER SYMPTOMS
ABDOMINAL PAIN: 1
WHEEZING: 0
SORE THROAT: 0
EYE PAIN: 0
VOMITING: 0
COUGH: 0
NAUSEA: 0
EYE DISCHARGE: 0
BACK PAIN: 0
DIARRHEA: 0
SHORTNESS OF BREATH: 0
EYE REDNESS: 0
SINUS PRESSURE: 0

## 2025-06-02 ASSESSMENT — LIFESTYLE VARIABLES
HOW OFTEN DO YOU HAVE A DRINK CONTAINING ALCOHOL: 2-4 TIMES A MONTH
HOW MANY STANDARD DRINKS CONTAINING ALCOHOL DO YOU HAVE ON A TYPICAL DAY: 1 OR 2

## 2025-06-02 ASSESSMENT — PAIN SCALES - GENERAL: PAINLEVEL_OUTOF10: 6

## 2025-06-03 ENCOUNTER — APPOINTMENT (OUTPATIENT)
Dept: CT IMAGING | Age: 40
End: 2025-06-03
Payer: COMMERCIAL

## 2025-06-03 PROCEDURE — 6360000004 HC RX CONTRAST MEDICATION: Performed by: RADIOLOGY

## 2025-06-03 PROCEDURE — 96372 THER/PROPH/DIAG INJ SC/IM: CPT

## 2025-06-03 PROCEDURE — 6360000002 HC RX W HCPCS: Performed by: EMERGENCY MEDICINE

## 2025-06-03 PROCEDURE — 6370000000 HC RX 637 (ALT 250 FOR IP): Performed by: EMERGENCY MEDICINE

## 2025-06-03 PROCEDURE — 74177 CT ABD & PELVIS W/CONTRAST: CPT

## 2025-06-03 PROCEDURE — 96361 HYDRATE IV INFUSION ADD-ON: CPT

## 2025-06-03 RX ORDER — CEFTRIAXONE 500 MG/1
500 INJECTION, POWDER, FOR SOLUTION INTRAMUSCULAR; INTRAVENOUS ONCE
Status: COMPLETED | OUTPATIENT
Start: 2025-06-03 | End: 2025-06-03

## 2025-06-03 RX ORDER — DOXYCYCLINE HYCLATE 100 MG
100 TABLET ORAL 2 TIMES DAILY
Qty: 20 TABLET | Refills: 0 | Status: SHIPPED | OUTPATIENT
Start: 2025-06-03 | End: 2025-06-13

## 2025-06-03 RX ORDER — IOPAMIDOL 755 MG/ML
75 INJECTION, SOLUTION INTRAVASCULAR
Status: COMPLETED | OUTPATIENT
Start: 2025-06-03 | End: 2025-06-03

## 2025-06-03 RX ORDER — DOXYCYCLINE 100 MG/1
100 CAPSULE ORAL ONCE
Status: COMPLETED | OUTPATIENT
Start: 2025-06-03 | End: 2025-06-03

## 2025-06-03 RX ADMIN — IOPAMIDOL 75 ML: 755 INJECTION, SOLUTION INTRAVENOUS at 00:29

## 2025-06-03 RX ADMIN — CEFTRIAXONE SODIUM 500 MG: 500 INJECTION, POWDER, FOR SOLUTION INTRAMUSCULAR; INTRAVENOUS at 05:18

## 2025-06-03 RX ADMIN — DOXYCYCLINE HYCLATE 100 MG: 100 CAPSULE ORAL at 05:17

## 2025-06-03 NOTE — ED PROVIDER NOTES
Patient is a 38 y/o male who presents to the ED with abdominal pain and testicular pain and swelling. Patient states that he was lifting weights when he had acute onset of pain and swelling in his scrotum. He states the pain and swelling has continued for the past three days. It involves his left testicle and radiates into the left side of his abdomen. Currently, his pain is 7/10. He denies any dysuria or penile discharge. He denies any fever or flank pain. He is not concerned for STD.         Review of Systems   Constitutional:  Negative for chills and fever.   HENT:  Negative for ear pain, sinus pressure and sore throat.    Eyes:  Negative for pain, discharge and redness.   Respiratory:  Negative for cough, shortness of breath and wheezing.    Cardiovascular:  Negative for chest pain.   Gastrointestinal:  Positive for abdominal pain. Negative for diarrhea, nausea and vomiting.   Genitourinary:  Positive for scrotal swelling and testicular pain. Negative for dysuria and frequency.   Musculoskeletal:  Negative for arthralgias and back pain.   Skin:  Negative for rash and wound.   Neurological:  Negative for weakness and headaches.   Hematological:  Negative for adenopathy.   All other systems reviewed and are negative.       Physical Exam  Vitals and nursing note reviewed.   Constitutional:       General: He is not in acute distress.  HENT:      Head: Normocephalic and atraumatic.      Right Ear: External ear normal.      Left Ear: External ear normal.      Nose: Nose normal.      Mouth/Throat:      Mouth: Mucous membranes are moist.   Eyes:      Conjunctiva/sclera: Conjunctivae normal.      Pupils: Pupils are equal, round, and reactive to light.   Cardiovascular:      Rate and Rhythm: Normal rate and regular rhythm.      Heart sounds: No murmur heard.  Pulmonary:      Effort: Pulmonary effort is normal. No respiratory distress.      Breath sounds: Normal breath sounds. No stridor. No wheezing, rhonchi or rales.